# Patient Record
Sex: MALE | Race: WHITE | NOT HISPANIC OR LATINO | Employment: OTHER | URBAN - NONMETROPOLITAN AREA
[De-identification: names, ages, dates, MRNs, and addresses within clinical notes are randomized per-mention and may not be internally consistent; named-entity substitution may affect disease eponyms.]

---

## 2019-10-14 ENCOUNTER — OFFICE VISIT (OUTPATIENT)
Dept: INTERNAL MEDICINE | Facility: CLINIC | Age: 59
End: 2019-10-14

## 2019-10-14 VITALS
RESPIRATION RATE: 16 BRPM | BODY MASS INDEX: 29.12 KG/M2 | HEIGHT: 72 IN | SYSTOLIC BLOOD PRESSURE: 134 MMHG | DIASTOLIC BLOOD PRESSURE: 80 MMHG | HEART RATE: 96 BPM | OXYGEN SATURATION: 99 % | WEIGHT: 215 LBS | TEMPERATURE: 98.4 F

## 2019-10-14 DIAGNOSIS — J01.10 ACUTE NON-RECURRENT FRONTAL SINUSITIS: Primary | ICD-10-CM

## 2019-10-14 PROCEDURE — 99203 OFFICE O/P NEW LOW 30 MIN: CPT | Performed by: INTERNAL MEDICINE

## 2019-10-14 RX ORDER — ATORVASTATIN CALCIUM 20 MG/1
20 TABLET, FILM COATED ORAL DAILY
Refills: 1 | COMMUNITY
Start: 2019-09-16 | End: 2020-07-06 | Stop reason: SDUPTHER

## 2019-10-14 RX ORDER — PSEUDOEPHEDRINE HCL 30 MG
30 TABLET ORAL EVERY 6 HOURS PRN
Qty: 30 TABLET | Refills: 0 | Status: SHIPPED | OUTPATIENT
Start: 2019-10-14 | End: 2020-01-30

## 2019-10-14 RX ORDER — LEVOFLOXACIN 750 MG/1
750 TABLET ORAL DAILY
Qty: 5 TABLET | Refills: 0 | Status: SHIPPED | OUTPATIENT
Start: 2019-10-14 | End: 2019-10-31

## 2019-10-14 RX ORDER — GUAIFENESIN 600 MG/1
1200 TABLET, EXTENDED RELEASE ORAL 2 TIMES DAILY
Qty: 20 TABLET | Refills: 0 | Status: SHIPPED | OUTPATIENT
Start: 2019-10-14 | End: 2020-01-30

## 2019-10-14 RX ORDER — MELOXICAM 15 MG/1
15 TABLET ORAL DAILY
Refills: 3 | COMMUNITY
Start: 2019-09-16 | End: 2021-04-06 | Stop reason: SDUPTHER

## 2019-10-14 RX ORDER — LISINOPRIL 10 MG/1
10 TABLET ORAL DAILY
Refills: 3 | COMMUNITY
Start: 2019-09-16 | End: 2020-07-13

## 2019-10-14 RX ORDER — OMEPRAZOLE 20 MG/1
20 CAPSULE, DELAYED RELEASE ORAL DAILY
Refills: 3 | COMMUNITY
Start: 2019-09-16 | End: 2020-07-13 | Stop reason: SDUPTHER

## 2019-10-14 NOTE — PROGRESS NOTES
Subjective     Chief Complaint   Patient presents with   • Sinus Problem     headcold       History of Present Illness  About 3 weeks ago patient started having lef pilar tooth pain. Has had a root canal and crown. Patient took some ibuprofen for a couple days weds better. Thursday morning all of the teeth on the left side were hurting. Was concerned about an abscess. When bending over brushing teeth left yellow nasal drainage. Saw dentist on 2 weeks ago on Friday. Zpack and pain medication was given. Slightly improved. Continued to have yellow nasal discharge. Patient was given doxycyline for 10 days. Improved. Patient was also given a prednisone pack and Flonaise.   When drove to [place] about a week ago, started having frontal bilateral sinus pain. Worse on the left and traces eyebrow. Feels about 75% good.   No fever, no chills. Occasional nausea. Appetite good. No ear pain. Little cough. Mild decrease in hearing in the let ear, but after Doxycycline, patient has improved.   Kidney function is good. No HX of renal failure. No HX of seizure disorder. No HX of torn rotator cuffs or tendon injury.     Patient's PMR from outside medical facility reviewed and noted.    Review of Systems   Constitutional: Negative for chills and fever.   HENT: Positive for sinus pressure and sinus pain. Negative for sore throat.    Respiratory: Positive for cough. Negative for shortness of breath.    Gastrointestinal: Positive for nausea. Negative for diarrhea and vomiting.   Skin: Negative for color change and wound.        Otherwise complete ROS reviewed and negative except as mentioned in the HPI.    Past Medical History:   Past Medical History:   Diagnosis Date   • Arthritis    • GERD (gastroesophageal reflux disease)    • Hyperlipidemia    • Hypertension      Past Surgical History:History reviewed. No pertinent surgical history.     Social History:  reports that he quit smoking about 21 years ago. His smoking use  "included cigarettes. He has never used smokeless tobacco. He reports that he drinks alcohol. He reports that he does not use drugs.    Family History: family history includes Cancer in his mother; Tuberculosis in his maternal grandfather.       Allergies:  Allergies   Allergen Reactions   • Sulfa Antibiotics Shortness Of Breath   • Penicillins Rash     Happened 30 yrs + ago       Medications:  Prior to Admission medications    Medication Sig Start Date End Date Taking? Authorizing Provider   atorvastatin (LIPITOR) 20 MG tablet Take 20 mg by mouth Daily. 9/16/19   Hao Otoole MD   lisinopril (PRINIVIL,ZESTRIL) 10 MG tablet Take 10 mg by mouth Daily. 9/16/19   Hao Otoole MD   meloxicam (MOBIC) 15 MG tablet Take 15 mg by mouth Daily. Takes every other day for back 9/16/19   Hao Otoole MD   metFORMIN (GLUCOPHAGE) 500 MG tablet Take 500 mg by mouth 2 (Two) Times a Day. 8/20/19   Hao Otoole MD   omeprazole (priLOSEC) 20 MG capsule Take 20 mg by mouth Daily. 9/16/19   Hao Otoole MD       Objective     Vital Signs: /80 (BP Location: Left arm, Patient Position: Sitting, Cuff Size: Adult)   Pulse 96   Temp 98.4 °F (36.9 °C) (Oral)   Resp 16   Ht 182.9 cm (72\")   Wt 97.5 kg (215 lb)   SpO2 99%   BMI 29.16 kg/m²   Physical Exam   Constitutional: He appears well-developed and well-nourished.   HENT:   Head: Normocephalic and atraumatic.   Nose: Nose normal.   Mouth/Throat: Oropharynx is clear and moist.   Eyes: Conjunctivae and EOM are normal.   Neck: Normal range of motion. Neck supple.   Cardiovascular: Normal rate, regular rhythm and normal heart sounds.   Pulmonary/Chest: Effort normal and breath sounds normal.   Abdominal: Soft. Bowel sounds are normal.   Musculoskeletal: He exhibits no edema or tenderness.   Neurological: He is alert. No cranial nerve deficit.   Skin: Skin is warm and dry.   Psychiatric: He has a normal mood and affect.   Vitals " reviewed.      Patient's Body mass index is 29.16 kg/m². BMI is within normal parameters. No follow-up required..      Results Reviewed:  No results found for: GLUCOSE, BUN, CREATININE, NA, K, CL, CO2, CALCIUM, ALT, AST, WBC, HCT, PLT, CHOL, TRIG, HDL, LDL, LDLHDL, HGBA1C      Assessment / Plan     Assessment/Plan:  1. Acute non-recurrent frontal sinusitis  - pseudoephedrine (SUDAFED) 30 MG tablet; Take 1 tablet by mouth Every 6 (Six) Hours As Needed for Congestion.  Dispense: 30 tablet; Refill: 0  - guaiFENesin (MUCINEX) 600 MG 12 hr tablet; Take 2 tablets by mouth 2 (Two) Times a Day.  Dispense: 20 tablet; Refill: 0  - levoFLOXacin (LEVAQUIN) 750 MG tablet; Take 1 tablet by mouth Daily.  Dispense: 5 tablet; Refill: 0    Discussed sie effects from Sudafed. Patient voiced understanding. Discussed drinking fluids while on Levaquin.     Return if symptoms worsen or fail to improve. unless patient needs to be seen sooner or acute issues arise.    Code Status: full    I have discussed the patient results/orders and and plan/recommendation with them at today's visit.      Elizabeth French, DO   10/14/2019

## 2019-10-31 ENCOUNTER — OFFICE VISIT (OUTPATIENT)
Dept: INTERNAL MEDICINE | Facility: CLINIC | Age: 59
End: 2019-10-31

## 2019-10-31 ENCOUNTER — RESULTS ENCOUNTER (OUTPATIENT)
Dept: INTERNAL MEDICINE | Facility: CLINIC | Age: 59
End: 2019-10-31

## 2019-10-31 VITALS
HEART RATE: 87 BPM | BODY MASS INDEX: 29.66 KG/M2 | SYSTOLIC BLOOD PRESSURE: 136 MMHG | HEIGHT: 72 IN | TEMPERATURE: 98.1 F | OXYGEN SATURATION: 98 % | WEIGHT: 219 LBS | DIASTOLIC BLOOD PRESSURE: 88 MMHG

## 2019-10-31 DIAGNOSIS — J01.10 ACUTE NON-RECURRENT FRONTAL SINUSITIS: Primary | ICD-10-CM

## 2019-10-31 DIAGNOSIS — H60.311 ACUTE DIFFUSE OTITIS EXTERNA OF RIGHT EAR: ICD-10-CM

## 2019-10-31 DIAGNOSIS — J01.10 ACUTE NON-RECURRENT FRONTAL SINUSITIS: ICD-10-CM

## 2019-10-31 PROCEDURE — 99213 OFFICE O/P EST LOW 20 MIN: CPT | Performed by: INTERNAL MEDICINE

## 2019-10-31 RX ORDER — AZELASTINE 1 MG/ML
2 SPRAY, METERED NASAL 2 TIMES DAILY
Qty: 30 ML | Refills: 12 | Status: SHIPPED | OUTPATIENT
Start: 2019-10-31 | End: 2019-12-19 | Stop reason: SDUPTHER

## 2019-10-31 RX ORDER — CIPROFLOXACIN AND DEXAMETHASONE 3; 1 MG/ML; MG/ML
4 SUSPENSION/ DROPS AURICULAR (OTIC) 4 TIMES DAILY
Qty: 7.5 ML | Refills: 0 | Status: SHIPPED | OUTPATIENT
Start: 2019-10-31 | End: 2020-01-30

## 2019-10-31 RX ORDER — LEVOFLOXACIN 750 MG/1
750 TABLET ORAL DAILY
Qty: 5 TABLET | Refills: 0 | Status: SHIPPED | OUTPATIENT
Start: 2019-10-31 | End: 2020-01-30

## 2019-10-31 NOTE — PROGRESS NOTES
Subjective     Chief Complaint   Patient presents with   • Nasal Congestion       History of Present Illness  Yellow nasal discharge from the left. Frontal sinus pressure. Recovered from the previous infection, but now feels like it is coming back. States that he had some rapid heart beats from the Sudafed and DCd it as directed. Doesn't feel like his breathing has been affected. Mild cough from drainage. No fever or chills.     Patient's PMR from outside medical facility reviewed and noted.    Review of Systems   Constitutional: Negative for chills and fatigue.   HENT: Positive for ear discharge, ear pain, sinus pressure and sinus pain. Negative for tinnitus.    Respiratory: Positive for cough. Negative for shortness of breath.    Gastrointestinal: Negative for constipation, diarrhea, nausea and vomiting.   Genitourinary: Negative for dysuria and hematuria.   Skin: Negative for color change and wound.   Neurological: Negative for seizures and headaches.        Otherwise complete ROS reviewed and negative except as mentioned in the HPI.    Past Medical History:   Past Medical History:   Diagnosis Date   • Arthritis    • GERD (gastroesophageal reflux disease)    • Hyperlipidemia    • Hypertension      Past Surgical History:History reviewed. No pertinent surgical history.  Social History:  reports that he quit smoking about 21 years ago. His smoking use included cigarettes. He has never used smokeless tobacco. He reports that he drinks alcohol. He reports that he does not use drugs.    Family History: family history includes Cancer in his mother; Tuberculosis in his maternal grandfather.       Allergies:  Allergies   Allergen Reactions   • Sulfa Antibiotics Shortness Of Breath   • Penicillins Rash     Happened 30 yrs + ago       Medications:  Prior to Admission medications    Medication Sig Start Date End Date Taking? Authorizing Provider   atorvastatin (LIPITOR) 20 MG tablet Take 20 mg by mouth Daily. 9/16/19  " Yes ProviderHao MD   guaiFENesin (MUCINEX) 600 MG 12 hr tablet Take 2 tablets by mouth 2 (Two) Times a Day. 10/14/19  Yes Elizabeth French DO   lisinopril (PRINIVIL,ZESTRIL) 10 MG tablet Take 10 mg by mouth Daily. 9/16/19  Yes Hao Otoole MD   meloxicam (MOBIC) 15 MG tablet Take 15 mg by mouth Daily. Takes every other day for back 9/16/19  Yes Hao Otoole MD   metFORMIN (GLUCOPHAGE) 500 MG tablet Take 500 mg by mouth 2 (Two) Times a Day. 8/20/19  Yes Hao Otoole MD   omeprazole (priLOSEC) 20 MG capsule Take 20 mg by mouth Daily. 9/16/19  Yes Hao Otoole MD   levoFLOXacin (LEVAQUIN) 750 MG tablet Take 1 tablet by mouth Daily. 10/14/19   Elizabeth French DO   pseudoephedrine (SUDAFED) 30 MG tablet Take 1 tablet by mouth Every 6 (Six) Hours As Needed for Congestion. 10/14/19   Elizabeth French DO       Objective     Vital Signs: /88 (BP Location: Left arm, Patient Position: Sitting, Cuff Size: Large Adult)   Pulse 87   Temp 98.1 °F (36.7 °C) (Oral)   Ht 182.9 cm (72\")   Wt 99.3 kg (219 lb)   SpO2 98%   BMI 29.70 kg/m²   Physical Exam   Constitutional: He appears well-developed and well-nourished.   HENT:   Head: Normocephalic and atraumatic.   Nose: Nose normal.   Mouth/Throat: Oropharynx is clear and moist.   Right ear canal is bright red. Yellow discharge at the TM with erythema of the TM.    Eyes: Conjunctivae and EOM are normal.   Neck: Normal range of motion. Neck supple.   Cardiovascular: Normal rate, regular rhythm and normal heart sounds.   Pulmonary/Chest: Effort normal and breath sounds normal.   Abdominal: Soft. Bowel sounds are normal.   Musculoskeletal: He exhibits no edema or tenderness.   Neurological: He is alert. No cranial nerve deficit.   Skin: Skin is warm and dry.   Psychiatric: He has a normal mood and affect.   Vitals reviewed.      Patient's Body mass index is 29.7 kg/m². BMI is within normal parameters. No follow-up " required..      Results Reviewed:  No results found for: GLUCOSE, BUN, CREATININE, NA, K, CL, CO2, CALCIUM, ALT, AST, WBC, HCT, PLT, CHOL, TRIG, HDL, LDL, LDLHDL, HGBA1C      Assessment / Plan     Assessment/Plan:  1. Acute non-recurrent frontal sinusitis  - Respiratory Panel, PCR - Swab, Nasopharynx; Future  - Respiratory Culture - Sputum, Cough; Future  - azelastine (ASTELIN) 0.1 % nasal spray; 2 sprays into the nostril(s) as directed by provider 2 (Two) Times a Day. Use in each nostril as directed  Dispense: 30 mL; Refill: 12  - levoFLOXacin (LEVAQUIN) 750 MG tablet; Take 1 tablet by mouth Daily.  Dispense: 5 tablet; Refill: 0    2. Acute diffuse otitis externa of right ear  - ciprofloxacin-dexamethasone (CIPRODEX) 0.3-0.1 % otic suspension; Administer 4 drops to the right ear 4 (Four) Times a Day.  Dispense: 7.5 mL; Refill: 0    - Nothing in the ears but the medication. Dry the ear out and return for FU to evaluate for fungus and resolution. If worsens, call.     Return in about 2 weeks (around 11/14/2019) for Recheck. unless patient needs to be seen sooner or acute issues arise.    Code Status: Full    I have discussed the patient results/orders and and plan/recommendation with them at today's visit.      Elizabeth French, DO   10/31/2019

## 2019-12-19 ENCOUNTER — OFFICE VISIT (OUTPATIENT)
Dept: INTERNAL MEDICINE | Facility: CLINIC | Age: 59
End: 2019-12-19

## 2019-12-19 VITALS
TEMPERATURE: 98 F | BODY MASS INDEX: 30.48 KG/M2 | HEART RATE: 86 BPM | OXYGEN SATURATION: 99 % | WEIGHT: 225 LBS | SYSTOLIC BLOOD PRESSURE: 162 MMHG | HEIGHT: 72 IN | DIASTOLIC BLOOD PRESSURE: 85 MMHG

## 2019-12-19 DIAGNOSIS — J01.10 ACUTE NON-RECURRENT FRONTAL SINUSITIS: ICD-10-CM

## 2019-12-19 DIAGNOSIS — G89.29 CHRONIC MIDLINE LOW BACK PAIN WITH RIGHT-SIDED SCIATICA: ICD-10-CM

## 2019-12-19 DIAGNOSIS — R09.81 NASAL CONGESTION: ICD-10-CM

## 2019-12-19 DIAGNOSIS — R06.83 SNORING: Primary | ICD-10-CM

## 2019-12-19 DIAGNOSIS — H61.21 IMPACTED CERUMEN OF RIGHT EAR: ICD-10-CM

## 2019-12-19 DIAGNOSIS — M54.41 CHRONIC MIDLINE LOW BACK PAIN WITH RIGHT-SIDED SCIATICA: ICD-10-CM

## 2019-12-19 PROCEDURE — 99213 OFFICE O/P EST LOW 20 MIN: CPT | Performed by: INTERNAL MEDICINE

## 2019-12-19 RX ORDER — AZELASTINE 1 MG/ML
2 SPRAY, METERED NASAL 2 TIMES DAILY
Qty: 30 ML | Refills: 12 | Status: SHIPPED | OUTPATIENT
Start: 2019-12-19 | End: 2020-01-30

## 2019-12-19 NOTE — PROGRESS NOTES
Subjective     Chief Complaint   Patient presents with   • Earache     stopped up feeling   • Sinus Problem       History of Present Illness  Drainage in the back of his throat. Hearing feels like it is at 60%. Worse on the right.  Has been using Flonase.  Feels occasionally like heart is racing. Over the last week has happened about 2-3 times. Patient has been noticing at night and wakes him up. We discuss LINH. Patient states that he knows that he has it. Hasn't had a good night sleep since he stopped smoking.  Patient has seen Manish Ayoub in the past from back pain. Sciatic nerve issues. Was DDX with DJD. Over the last 6-7 months, the patient has had worsening back pain. He takes Mobic QOD.   Sees Dr. Garvey on Friday and is going to talk to him about it.    Patient's PMR from outside medical facility reviewed and noted.    Review of Systems   Constitutional: Negative for chills and fever.   HENT: Positive for congestion, ear pain, hearing loss and sinus pressure.    Respiratory: Negative for cough and shortness of breath.    Cardiovascular: Positive for palpitations. Negative for chest pain and leg swelling.   Gastrointestinal: Negative for constipation, diarrhea, nausea and vomiting.   Genitourinary: Negative for dysuria and hematuria.   Musculoskeletal: Positive for back pain. Negative for joint swelling.   Skin: Negative for color change and wound.   Neurological: Negative for weakness and headaches.        Otherwise complete ROS reviewed and negative except as mentioned in the HPI.    Past Medical History:   Past Medical History:   Diagnosis Date   • Arthritis    • GERD (gastroesophageal reflux disease)    • Hyperlipidemia    • Hypertension      Past Surgical History:History reviewed. No pertinent surgical history.     Social History:  reports that he quit smoking about 21 years ago. His smoking use included cigarettes. He has never used smokeless tobacco. He reports that he drinks alcohol. He reports  "that he does not use drugs.    Family History: family history includes Cancer in his mother; Tuberculosis in his maternal grandfather.       Allergies:  Allergies   Allergen Reactions   • Sulfa Antibiotics Shortness Of Breath   • Penicillins Rash     Happened 30 yrs + ago       Medications:  Prior to Admission medications    Medication Sig Start Date End Date Taking? Authorizing Provider   atorvastatin (LIPITOR) 20 MG tablet Take 20 mg by mouth Daily. 9/16/19  Yes Hao Otoole MD   azelastine (ASTELIN) 0.1 % nasal spray 2 sprays into the nostril(s) as directed by provider 2 (Two) Times a Day. Use in each nostril as directed 10/31/19  Yes Elizabeth French DO   lisinopril (PRINIVIL,ZESTRIL) 10 MG tablet Take 10 mg by mouth Daily. 9/16/19  Yes Hao Otoole MD   meloxicam (MOBIC) 15 MG tablet Take 15 mg by mouth Daily. Takes every other day for back 9/16/19  Yes ProviderHao MD   metFORMIN (GLUCOPHAGE) 500 MG tablet Take 500 mg by mouth 2 (Two) Times a Day. 8/20/19  Yes ProviderHao MD   omeprazole (priLOSEC) 20 MG capsule Take 20 mg by mouth Daily. 9/16/19  Yes ProviderHao MD   ciprofloxacin-dexamethasone (CIPRODEX) 0.3-0.1 % otic suspension Administer 4 drops to the right ear 4 (Four) Times a Day. 10/31/19   Elizabeth French DO   guaiFENesin (MUCINEX) 600 MG 12 hr tablet Take 2 tablets by mouth 2 (Two) Times a Day. 10/14/19   Elizabeth French DO   levoFLOXacin (LEVAQUIN) 750 MG tablet Take 1 tablet by mouth Daily. 10/31/19   Elizabeth French DO   pseudoephedrine (SUDAFED) 30 MG tablet Take 1 tablet by mouth Every 6 (Six) Hours As Needed for Congestion. 10/14/19   Elizabeth French DO       Objective     Vital Signs: /85 (BP Location: Left arm, Patient Position: Sitting, Cuff Size: Adult)   Pulse 86   Temp 98 °F (36.7 °C) (Temporal)   Ht 182.9 cm (72\")   Wt 102 kg (225 lb)   SpO2 99%   BMI 30.52 kg/m²   Physical Exam   Constitutional: He appears " well-developed and well-nourished.   HENT:   Head: Normocephalic and atraumatic.   Nose: Nose normal.   Mouth/Throat: Oropharynx is clear and moist.   Right cerumen impaction. Nasal turbinate boggy and edematous.    Eyes: Conjunctivae and EOM are normal.   Neck: Normal range of motion. Neck supple.   Cardiovascular: Normal rate, regular rhythm and normal heart sounds.   Pulmonary/Chest: Effort normal and breath sounds normal.   Abdominal: Soft. Bowel sounds are normal.   Musculoskeletal: He exhibits no edema or tenderness.   Neurological: He is alert. No cranial nerve deficit.   Skin: Skin is warm and dry.   Psychiatric: He has a normal mood and affect.   Vitals reviewed.      Patient's Body mass index is 30.52 kg/m². BMI is within normal parameters. No follow-up required..      Results Reviewed:  No results found for: GLUCOSE, BUN, CREATININE, NA, K, CL, CO2, CALCIUM, ALT, AST, WBC, HCT, PLT, CHOL, TRIG, HDL, LDL, LDLHDL, HGBA1C      Assessment / Plan     Assessment/Plan:  1. Snoring  - Home Sleep Study; Future  - We discuss that his palpitation wkaing him may be arrhythmia from sleep apnea.     2. Chronic midline low back pain with right-sided sciatica  - Ambulatory Referral to Physical Therapy    3. Nasal congestion  - Ambulatory Referral to ENT (Otolaryngology)  - Resume Astelin. RX renewal sent to pharmacy.     4. Acute non-recurrent frontal sinusitis  - azelastine (ASTELIN) 0.1 % nasal spray; 2 sprays into the nostril(s) as directed by provider 2 (Two) Times a Day. Use in each nostril as directed  Dispense: 30 mL; Refill: 12    5. Right cerumen impaction  -OTC debrox  - Return in 2 weeks to have wax removed once softened.     Return if symptoms worsen or fail to improve. unless patient needs to be seen sooner or acute issues arise.    Code Status: Full    I have discussed the patient results/orders and and plan/recommendation with them at today's visit.      Elizabeth French, DO   12/19/2019

## 2019-12-23 ENCOUNTER — OFFICE VISIT (OUTPATIENT)
Dept: OTOLARYNGOLOGY | Facility: CLINIC | Age: 59
End: 2019-12-23

## 2019-12-23 VITALS
SYSTOLIC BLOOD PRESSURE: 116 MMHG | BODY MASS INDEX: 30.8 KG/M2 | HEART RATE: 109 BPM | HEIGHT: 72 IN | WEIGHT: 227.4 LBS | DIASTOLIC BLOOD PRESSURE: 73 MMHG | TEMPERATURE: 96.8 F

## 2019-12-23 DIAGNOSIS — H61.23 CERUMEN DEBRIS ON TYMPANIC MEMBRANE, BILATERAL: Primary | ICD-10-CM

## 2019-12-23 DIAGNOSIS — H69.83 DYSFUNCTION OF BOTH EUSTACHIAN TUBES: ICD-10-CM

## 2019-12-23 DIAGNOSIS — H90.0 HEARING LOSS, CONDUCTIVE, BILATERAL: ICD-10-CM

## 2019-12-23 DIAGNOSIS — J31.0 RHINITIS, CHRONIC: ICD-10-CM

## 2019-12-23 DIAGNOSIS — J34.2 ACQUIRED DEVIATED NASAL SEPTUM: ICD-10-CM

## 2019-12-23 DIAGNOSIS — J32.9 CHRONIC SINUSITIS, UNSPECIFIED LOCATION: ICD-10-CM

## 2019-12-23 PROCEDURE — 99204 OFFICE O/P NEW MOD 45 MIN: CPT | Performed by: OTOLARYNGOLOGY

## 2019-12-23 PROCEDURE — 69210 REMOVE IMPACTED EAR WAX UNI: CPT | Performed by: OTOLARYNGOLOGY

## 2019-12-23 PROCEDURE — 31231 NASAL ENDOSCOPY DX: CPT | Performed by: OTOLARYNGOLOGY

## 2019-12-23 RX ORDER — FLUTICASONE PROPIONATE 50 MCG
2 SPRAY, SUSPENSION (ML) NASAL 2 TIMES DAILY
Qty: 48 G | Refills: 3 | Status: SHIPPED | OUTPATIENT
Start: 2019-12-23 | End: 2020-07-13 | Stop reason: SDUPTHER

## 2019-12-23 RX ORDER — PREDNISONE 10 MG/1
TABLET ORAL
Qty: 21 TABLET | Refills: 1 | Status: SHIPPED | OUTPATIENT
Start: 2019-12-23 | End: 2020-01-01

## 2019-12-23 RX ORDER — OXYMETAZOLINE HYDROCHLORIDE 0.05 G/100ML
2 SPRAY NASAL 3 TIMES DAILY
Qty: 2 ML | Refills: 0 | Status: SHIPPED | OUTPATIENT
Start: 2019-12-23 | End: 2019-12-26

## 2019-12-23 NOTE — PATIENT INSTRUCTIONS
EAR CARE: :using oil  Use a hair dryer on low heat and blow into the ear canals 2 times daily to keep ears dry.  DO Not use Q tips or Dinora pins, ever!!  Do not use alcohol in the ear canal (this will cause dryness and itching)  NO peroxide or alcohol in ears  Oil: Use Sweet oil, Olive oil, or Mineral oil, purchased over the counter, once a week, Do not use Q tips, You may use a hair dryer on low heat. Blow in ears for 10-15 seconds 2 times daily to dry ear canals or if ear canals are itching.    NASAL SALINE:  Use 2 puffs each nostril 4-6 times daily and more frequently if possible.  You can buy saline spray or you can make your own and use an old spray bottle to administer  Use a humidifier at bedside  Recipe for saline:d  Water                                 1 quart  Salt (table)                        1 tablespoon  Gylcerin (or Poly Syrup)    1 teaspoon  Sodium bicarbonate           1 teaspoon  Sprays or Divide pots are recommended    Nasal steroid use:  Using nasal steroids:  You will be prescribed one of the following nasal steroids: Flonase, Nasacort, Nasonex, Rhinocort, Qnasl, Zetonna  2 puffs each nostril 2 times daily  Start as soon as possible  If you are using Afrin for 3 days with the nasal steroid,  Use Afrin first and wait 10 minutes to allow the nose to open. Then administer nasal steroids.    Afrin use:  Use 2 puffs each nostril 3 times daily for 3 days only!!  Stop using after 3 days unless instructed to use longer    Stop Azelatine spray    Prednisone 10 mg wean take as directed.    Thank you for enrolling in [facility]. Please follow the instructions below to securely access your online medical record. [facility] allows you to send messages to your doctor, view your test results, renew your prescriptions, schedule appointments, and more.    How Do I Sign Up?  1. In your Internet browser, go to [facility]  2. Click on the Sign Up Now link in the New User? box.   3. Enter your [employer]  Activation Code exactly as it appears below. You will not need to use this code after you have completed the sign-up process. If you do not sign up before the expiration date, you must request a new code.  [facility] Activation Code: [email or web address]  Expires: 1/14/2020  3:41 AM    4. Enter the last four digits of your Social Security Number and your Date of Birth as indicated and click Next. You will be taken to the next sign-up page.  5. Create a [facility] username. Think of one that is secure and easy to remember.  6. Create a [employer] password. You can change your password at any time.  7. Choose a security question, enter your answer, and click Next. This can be used to access [facility] if you forget your password.   8. Select your communication preference. Enter a valid e-mail address if you would like to receive e-mail notifications when new information is available in [employer].  9. Click Sign In. You can now view your medical record.     Additional Information  If you have questions, you can e-mail [email or web address], or call [phone] to talk to our [employer] staff. Remember, [employer] is NOT to be used for urgent needs. For medical emergencies, dial 911.

## 2019-12-23 NOTE — PROGRESS NOTES
ENT PROCEDURE NOTE     Anesthesia: topical 4% tetracaine and oxymetazoline mix    Endoscopy Type:  Nasal Endoscopic Examination    Procedure Details:    The patient was placed in the sitting position. After topical anesthesia and decongestion,  a rigid nasal endoscope  0 degree was passed along the nasal floor to the nasopharynx.  The scope was then passed to the middle and superior aspects of the nasal cavity. Systematic examination of the nasal cavity, nasopharynx and structures was performed.     Findings:  NASAL ENDOSCOPIC FINDINGS: Nasal endoscopy   NASALMUCOSA:    abnormal:        Bilateral- red, dry thick    NASAL PASSAGES:     abnormal   Left- partially obstructed by septum, Right- very narrowed   NASAL VALVE:     intact, good support and no nasal obstruction   SEPTUM:     mucosa abnormal   Bilateral- red, dry, thick     deviation present:      deviated Left low/mid shelf almost touching IT   INFERIOR TURBINATES:     abnormal  Bilateral- red, thick, enlarged    MIDDLE TURBINATES:     abnormal:        BILATERAL: red, moist, enlarged   MIDDLE MEATUS:       findings       BILATERAL: mucosa thick, secretions present, no mucopus   SPHENO-ETHMOID RECESS:    normal, normal sphenoid ostia. no mucopus, non-surgical   NASOPHARYNX:     Adenoids:  red, thick     Eustachian tubes:        BILATERAL: red, thick   SECRETIONS:     abnormal Bilateral- dry, no mucopus     Condition:  Stable.  Patient tolerated procedure well.    Complications:  None    Post-procedure instructions reviewed with Patient  Instructions documented in AVS for patient to review    Cricket Olguin Jr, MD  12/23/19  3:15 PM

## 2019-12-23 NOTE — PROGRESS NOTES
Cricket Olguin Jr, MD     Chief Complaint   Patient presents with   • Sinus Problem        HISTORY OF PRESENT ILLNESS:   Accompanied by:   No one  Brandon Campbell is a  59 y.o. male with sinus infection.  He awoke in Fall, with yellow thin liquid from L nostril. He blew nose, Later, LUQ tooth hurt. Had another episode later in the day.  Thought abscessed tooth. Dentist evaluated. Told sinus.  Saw Dr Ayers. Face pain. Given Z pack and pain pills. Had severe pain. Z gerber no help. PCP changed antibiotics. Partially helped. Pain has resolved.  Seen 2 weeks after and repeated the antibiotics and steroids.  He now has no pain, but has clogged ears, PND.  No oral steroids.  He has been given nose spray and ear drops. Day 3 nose spray. Changed from Flonase to Azelastine.  Imaging- none  Smoke- none  Drink- 2 daily.  Allergy testing none  Hearing - decreased solomon lately    Review of Systems  Reviewed per patient intake note and confirmed with patient    Past History:  Past Medical History:   Diagnosis Date   • Arthritis    • GERD (gastroesophageal reflux disease)    • Hyperlipidemia    • Hypertension      History reviewed. No pertinent surgical history.  Family History   Problem Relation Age of Onset   • Cancer Mother    • Tuberculosis Maternal Grandfather      Social History     Tobacco Use   • Smoking status: Former Smoker     Types: Cigarettes     Last attempt to quit: 1998     Years since quittin.9   • Smokeless tobacco: Never Used   Substance Use Topics   • Alcohol use: Yes   • Drug use: No     No outpatient medications have been marked as taking for the 19 encounter (Office Visit) with Circket Olguin Jr., MD.     Allergies:  Sulfa antibiotics and Penicillins        Vital Signs:   Temp:  [96.8 °F (36 °C)] 96.8 °F (36 °C)  Heart Rate:  [109] 109  BP: (116)/(73) 116/73    EXAMINATION:   CONSTITUTIONAL:    well nourished, well-developed, alert, oriented, in no acute distress     BODY HABITUS:    Normal  body habitus    COMMUNICATION:    able to communicate normally, normal voice quality    HEAD:     Normocephalic, without obvious abnormality, atraumatic    FACE:    structure normal, no tenderness present, no lesions/masses, no evidence of trauma    SALIVARY GLANDS:    parotid glands with no tenderness, no swelling, no masses, submandibular glands with normal size, nontender     EYE:    ocular motility normal, eyelids normal, orbits normal, no proptosis, conjunctiva clear, sclera non-icteric, pupils equal, round, reactive to light and accomodation    HEARING:    response to conversational voice normal    EARS:    Otoscopic exam   Microscopic exam- See procedure note     NOSE EXTERNAL:    APPEARANCE: normal, straight, with good projection, no tenderness, no lesions, no tenderness, good nasal support, patent nares    NOSE INTERNAL: Nasal endoscopy        See procedure note for details Bilateral    ORAL CAVITY:    Normal lips with no lesions, dentition normal for age, FOM intact without lesions and normal salivary flow, Mucosa intact without lesions, Hard and soft palate normal without lesions    OROPHARYNX:    Direct examination  oropharyngeal mucosa normal, tonsil(s) with normal appearance      NECK:    short, thick  moderate   Neck position: lordotic    LYMPH NODES :    no adenopathy    THYROID:    no overt thyromegaly, no tenderness, nodules or mass present on palpation, position midline    CHEST/RESPIRATORY:    respiratory effort normal, no rales, rubs or wheezing, no stridor, normal appearance to chest    CARDIOVASCULAR:    regular rate and rhythm, no murmurs, gallups, no peripheral edema    NEURO/PSYCHIATRIC :    oriented appropriately for age, mood normal, affect appropriate, cranial nerves intact grossly (unless specifically described), gait normal for age    RESULTS REVIEW:    I have reviewed the patients old records in the chart.        ASSESSMENT:      Diagnosis Plan   1. Cerumen debris on tympanic  membrane, bilateral      Blocking TM and hearing  Hearing improved AU   2. Rhinitis, chronic  CT Sinus Without Contrast    mod inflammation   3. Dysfunction of both eustachian tubes      from Rhinitis   4. Hearing loss, conductive, bilateral      from cerumen impaction   5. Acquired deviated nasal septum  CT Sinus Without Contrast    R to L low/mid mild to mod   6. Chronic sinusitis, unspecified location  CT Sinus Without Contrast    by history           PLAN:     Conservative management.  patient has Bilateral mod cerumen causing hearing loss and ear symptoms. I have cleared today.  Nose appears inflamed and without infection today. I will treat with topical and oral steroids. I will get CT since he has been treated with antibiotics multiple times and no imaging obtained.  Afrin x 3 days  Flonase BID  Nasal saline  Stop Azelastine  Pred wean 10 mg  MY CHART:  Patient is Encouraged to enroll in My Chart  Encouraged to review data and findings in My Chart  New Medications Ordered This Visit   Medications   • fluticasone (FLONASE) 50 MCG/ACT nasal spray     Si sprays into the nostril(s) as directed by provider 2 (Two) Times a Day.     Dispense:  48 g     Refill:  3   • oxymetazoline (AFRIN) 0.05 % nasal spray     Si sprays into the nostril(s) as directed by provider 3 (Three) Times a Day for 3 days. Use for 3 days then stop     Dispense:  2 mL     Refill:  0   • predniSONE (DELTASONE) 10 MG tablet     Sig: Take 1 tablet by mouth 3 (Three) Times a Day for 3 days, THEN 1 tablet 2 (Two) Times a Day for 3 days, THEN 1 tablet Daily for 3 days.     Dispense:  21 tablet     Refill:  1     Planned taper     Orders Placed This Encounter   Procedures   • CT Sinus Without Contrast          Patient understand(s) and agree(s) with the treatment plan as described.  Return After CT sinus, for Recheck nose and ears.       Cricket Olguin Jr, MD  19  3:18 PM

## 2019-12-23 NOTE — PROGRESS NOTES
Nelida Ricks LPN   Patient Intake Note    Review of Systems  Review of Systems   Constitutional: Negative for chills, fatigue and fever.   HENT:        See hPI   Respiratory: Positive for cough. Negative for choking and shortness of breath.    Gastrointestinal: Negative for diarrhea, nausea and vomiting.   Neurological: Positive for dizziness, light-headedness and headaches.   Psychiatric/Behavioral: Negative for sleep disturbance.       Tobacco Use: Screening and Cessation Intervention  Social History    Tobacco Use      Smoking status: Former Smoker        Types: Cigarettes        Quit date: 1998        Years since quittin.9      Smokeless tobacco: Never Used        Nelida Ricks LPN  2019  3:03 PM

## 2019-12-23 NOTE — PROGRESS NOTES
ENT PROCEDURE NOTE     Pre-operative Diagnosis: Cerumen impaction    Post-operative Diagnosis: same    Anesthesia: none    Procedure:  Binocular ear microscopy with cerumen removal    Side:  BILATERAL Ears    Procedure Details:    The patient was placed supine on the procedure table. Using a speculum, the EAR SIDE: both ears were  examined with the microscope.   Using Micro-instrumentation and suction, the cerumen was cleared. Oil and suction was used    Findings:   Ear Canal: Bilateral obstructed with cerumen over tympanic membrane  AU- tympanic membrane thickened, moist, mildly macerated  Tympanic Membrane: Thickened, moist, mildly red, no infection  Ossicular Chain: intact AU  Middle ear space: not well seen AU  Hearing: improved after cleaning    Condition:  Stable.  Patient tolerated procedure well.    Complications:  None    Post-procedure instructions reviewed with Patient  Ear care initiated, instructions on AVS    Cricket Olguin Jr, MD  12/23/2019  3:42 PM

## 2020-01-08 ENCOUNTER — TELEPHONE (OUTPATIENT)
Dept: INTERNAL MEDICINE | Facility: CLINIC | Age: 60
End: 2020-01-08

## 2020-01-08 ENCOUNTER — HOSPITAL ENCOUNTER (OUTPATIENT)
Dept: CT IMAGING | Facility: HOSPITAL | Age: 60
Discharge: HOME | End: 2020-01-08
Admitting: OTOLARYNGOLOGY

## 2020-01-08 DIAGNOSIS — J31.0 RHINITIS, CHRONIC: ICD-10-CM

## 2020-01-08 DIAGNOSIS — J32.9 CHRONIC SINUSITIS, UNSPECIFIED LOCATION: ICD-10-CM

## 2020-01-08 DIAGNOSIS — J34.2 ACQUIRED DEVIATED NASAL SEPTUM: ICD-10-CM

## 2020-01-08 DIAGNOSIS — R05.9 COUGH: Primary | ICD-10-CM

## 2020-01-08 PROCEDURE — 70486 CT MAXILLOFACIAL W/O DYE: CPT

## 2020-01-08 NOTE — TELEPHONE ENCOUNTER
Pt contacted Dr. French stating he wasn't any better w/ cough.  He would like something prescribed.  Since she is out of the office, Dr. French asked if Dr. Spencer or Kristan Beebe could call in something to [facility].    Tussin/ Murtaza w/Velma

## 2020-01-08 NOTE — TELEPHONE ENCOUNTER
I called and spoke with him. He has been taking Delsym and has not been able to sleep. I sent in Tussionex #45 ml to [facility]. He has another appointment with Dr. Olguin on Monday to go over his CT of the sinuses.

## 2020-01-13 ENCOUNTER — OFFICE VISIT (OUTPATIENT)
Dept: OTOLARYNGOLOGY | Facility: CLINIC | Age: 60
End: 2020-01-13

## 2020-01-13 VITALS
HEIGHT: 72 IN | DIASTOLIC BLOOD PRESSURE: 75 MMHG | SYSTOLIC BLOOD PRESSURE: 126 MMHG | WEIGHT: 220 LBS | TEMPERATURE: 103 F | HEART RATE: 103 BPM | BODY MASS INDEX: 29.8 KG/M2

## 2020-01-13 DIAGNOSIS — H61.21 CERUMEN DEBRIS ON TYMPANIC MEMBRANE, RIGHT: ICD-10-CM

## 2020-01-13 DIAGNOSIS — H62.41 OTITIS EXTERNA, FUNGAL, RIGHT EAR: ICD-10-CM

## 2020-01-13 DIAGNOSIS — J32.9 CHRONIC SINUSITIS, UNSPECIFIED LOCATION: Chronic | ICD-10-CM

## 2020-01-13 DIAGNOSIS — J31.0 RHINITIS, CHRONIC: Primary | ICD-10-CM

## 2020-01-13 DIAGNOSIS — J34.3 HYPERTROPHY OF BOTH INFERIOR NASAL TURBINATES: ICD-10-CM

## 2020-01-13 DIAGNOSIS — J34.2 ACQUIRED DEVIATED NASAL SEPTUM: ICD-10-CM

## 2020-01-13 DIAGNOSIS — B36.9 OTITIS EXTERNA, FUNGAL, RIGHT EAR: ICD-10-CM

## 2020-01-13 PROCEDURE — 99214 OFFICE O/P EST MOD 30 MIN: CPT | Performed by: OTOLARYNGOLOGY

## 2020-01-13 PROCEDURE — 69210 REMOVE IMPACTED EAR WAX UNI: CPT | Performed by: OTOLARYNGOLOGY

## 2020-01-13 NOTE — PROGRESS NOTES
Nelida Ricks LPN   Patient Intake Note    Review of Systems  Review of Systems   Constitutional: Negative for diaphoresis and fatigue.   HENT:        See HPI   Respiratory: Positive for cough. Negative for choking and shortness of breath.    Gastrointestinal: Negative for diarrhea, nausea and vomiting.   Neurological: Negative for dizziness, light-headedness and headaches.   Psychiatric/Behavioral: Negative for sleep disturbance.       Tobacco Use: Screening and Cessation Intervention  Social History    Tobacco Use      Smoking status: Former Smoker        Types: Cigarettes        Quit date: 1998        Years since quittin.0      Smokeless tobacco: Never Used      Nelida Ricks LPN  2020  2:04 PM

## 2020-01-13 NOTE — PROGRESS NOTES
ENT PROCEDURE NOTE      Pre-operative Diagnosis: Cerumen impaction     Post-operative Diagnosis: same     Anesthesia: none     Procedure:  Binocular ear microscopy with cerumen removal     Side:  Left Ear     Procedure Details:    The patient was placed supine on the procedure table. Using a speculum, the EAR SIDE: Left ears were  examined with the microscope.   Using Micro-instrumentation and suction, the cerumen was cleared. Oil and suction was used     Findings:   Ear Canal: AD obstructed with cerumen over tympanic membrane and fungus present  Tympanic Membrane:  AD-Thickened, moist, mildly red, no infection  Ossicular Chain: intact AD  Middle ear space: not well seen AD  Hearing: improved after cleaning     Condition:  Stable.  Patient tolerated procedure well.     Complications:  None     Post-procedure instructions reviewed with Patient  Ear care initiated, instructions on AVS

## 2020-01-13 NOTE — PATIENT INSTRUCTIONS
EAR CARE: :using oil  Use a hair dryer on low heat and blow into the ear canals 2 times daily to keep ears dry.  DO Not use Q tips or Dinora pins, ever!!  Do not use alcohol in the ear canal (this will cause dryness and itching)  NO peroxide or alcohol in ears  Oil: Use Sweet oil, Olive oil, or Mineral oil, purchased over the counter, once every other day, Do not use Q tips, You may use a hair dryer on low heat. Blow in ears for 10-15 seconds 2 times daily to dry ear canals or if ear canals are itching.    Use plain white vinegar, Alternate Vinegar with oil, once every other day    PREOPERATIVE SURGERY/PROCEDURE INSTRUCTIONS:  Do not eat or drink ANYTHING after midnight, unless instructed     Clean the operative site by showering with an antibacterial soap (like Dial, Dove, Ivory, etc) and shampooing hair    Preoperative scrub for Surgery:  Skin: Antibacterial soap (Dial, Ivory, Dove) shower daily, including hair.  Be careful not to get into eyes  Do this daily for 5 days    Mouth: Oral rinse with Peroxide/Mouthwash solution mixed 50:50, OR use Listerine (this is antibacterial) solution 2 times daily for 5 days      Do NOT pluck, shave hair on skin the night prior to operation    If you are diabetic, take your blood sugar the night before and in the morning prior to coming to hospital and give results to nurse and the anesthesiologist    Remove any metallic piercings prior to surgery. You may wear plastic spacers if needed.    Do NOT apply eye makeup Morning of surgery    Please remove fingernail polish prior to surgery    STOP:  -   All natural/homeopathic medications 2 weeks prior to surgery, Ask about over the counter medications  -   Smoking 2 weeks prior to surgery  -   Blood thinners- 3-5 days prior to surgery (or as instructed by doctor)  Bring with you the morning of surgery:  -   Preoperative paperwork  -   Insurance card  -   Identification with photo  -   Home medications or up to date list    Cricket GREENFIELD  Sharif Morgan MD has explained the risks, benefits and alternatives to the patient/patient’s representative, in clear and simple language.  Time was allowed for questions.  Risks of procedure include but are not limited to:    As a result of this procedure being performed, the material risks generally recognized are INFECTION, ALLERGIC REACTION, RISK OF ANESTHESIA, SEVERE LOSS OF BLOOD, LOSS OR LOSS OF FUNCTION OF ANY LIMB OR ORGAN, PARALYSIS OR PARTIAL PARALYSIS, PARAPLEGIA OR QUADRIPLEGIA, DISFIGURING SCAR, BRAIN DAMAGE, CARDIAC ARREST OR DEATH, BLOOD LOSS NECESSITATING TRANSFUSION WHICH CARRIES THE RISK OF EXPOSURE TO AIDS, HEPATITIS OR OTHER INFECTIOUS DISEASES.      Procedure: Sinus surgery, Functional Endoscopic sinus surgery and Balloon Sinuplasty    Risks specific for procedure: pain, bleeding (with the possible need for nasal packing), infection, risks of the anesthesia, possible incomplete response to anesthesia requiring a conversion to a general anesthesia at a later date, orbital injury with blurred vision or visual loss, cerebrospinal fluid leak, injury to the brain, meningitis, intracranial bleeding, stroke, persistent disease and/ or symptoms, scarring, synechiae and the possibility for the need of reoperation. Possibilities of an inability to canulate and dilate the sinuses at the time of the operation.  Possibilities of additional sinus work or less sinus work depending on the status of the nose at the time of the operation was discussed    No guarantees of outcome given or implied  Patient demonstrate understanding    Patient does wish to  proceed with proposed procedure     [email or web address]

## 2020-01-13 NOTE — PROGRESS NOTES
Cricket Olguin Jr, MD     FOLLOW UP NOTE     Chief Complaint   Patient presents with   • Follow-up        HISTORY OF PRESENT ILLNESS:   Accompanied by:  No one  Chente Campbell is a  59 y.o. male who is here for follow up. Sinus CT completed.  R ear is stopped up.  He has not felt good.   No fever.    Review of Systems  Reviewed per patient intake note and confirmed by me    Past History:  Past medical and surgical history, family history and social history reviewed and updated when appropriate.  Current medications and allergies reviewed and updated when appropriate.  Allergies:  Sulfa antibiotics and Penicillins        Vital Signs:   Temp:  [103 °F (39.4 °C)] 103 °F (39.4 °C)  Heart Rate:  [103] 103  BP: (126)/(75) 126/75    EXAMINATION:   CONSTITUTIONAL:    well nourished, well-developed, alert, oriented, in no acute distress      BODY HABITUS:    Normal body habitus     COMMUNICATION:    able to communicate normally, normal voice quality     HEAD:     Normocephalic, without obvious abnormality, atraumatic     FACE:    structure normal, no tenderness present, no lesions/masses, no evidence of trauma     SALIVARY GLANDS:    parotid glands with no tenderness, no swelling, no masses, submandibular glands with normal size, nontender      EYE:    ocular motility normal, eyelids normal, orbits normal, no proptosis, conjunctiva clear, sclera non-icteric, pupils equal, round, reactive to light and accomodation     HEARING:    response to conversational voice normal     EARS:    Otoscopic exam   Microscopic exam- See procedure note     NOSE EXTERNAL:    APPEARANCE: normal, straight, with good projection, no tenderness, no lesions, no tenderness, good nasal support, patent nares     NOSE INTERNAL: Anterior rhininoscopy     NASALMUCOSA:    abnormal:        Bilateral- red, dry thick    NASAL PASSAGES:     abnormal   Left- partially obstructed by septum, Right- very narrowed   NASAL VALVE:     intact, good support and no  nasal obstruction   SEPTUM:     mucosa abnormal   Bilateral- red, dry, thick     deviation present:      deviated Left low/mid shelf almost touching IT   INFERIOR TURBINATES:     abnormal  Bilateral- red, thick, enlarged      ORAL CAVITY:    Normal lips with no lesions, dentition normal for age, FOM intact without lesions and normal salivary flow, Mucosa intact without lesions, Hard and soft palate normal without lesions     OROPHARYNX:    Direct examination  oropharyngeal mucosa normal, tonsil(s) with normal appearance       NECK:    short, thick  moderate   Neck position: lordotic     LYMPH NODES :    no adenopathy     THYROID:    no overt thyromegaly, no tenderness, nodules or mass present on palpation, position midline     CHEST/RESPIRATORY:    respiratory effort normal, no rales, rubs or wheezing, no stridor, normal appearance to chest     CARDIOVASCULAR:    regular rate and rhythm, no murmurs, gallups, no peripheral edema     NEURO/PSYCHIATRIC :    oriented appropriately for age, mood normal, affect appropriate, cranial nerves intact grossly (unless specifically described), gait normal for age    RESULTS REVIEW:    I have personally reviewed the patient's ct scan of the sinuses without contrast images.  I have personally reviewed the patient's ct scan of the sinuses without contrast report.    CT sinus 12/23/2019       ASSESSMENT:      Diagnosis Plan   1. Rhinitis, chronic  Case Request    Case Request    No change   2. Chronic sinusitis, unspecified location  Case Request    Case Request    L maxillary  L ethmoid   3. Acquired deviated nasal septum  Case Request    Case Request    L to R anterior mid moderate   4. Cerumen debris on tympanic membrane, right      Persistent   5. Hypertrophy of both inferior nasal turbinates  Case Request    Case Request    with obstruction   6. Otitis externa, fungal, right ear      Cleaned today           PLAN:   Medical and surgical options were discussed including  observation, continued medical management, medication modification and surgical management. Risks, benefits and alternatives were discussed and questions were answered. After considering the options, the patient decided to proceed with surgical management.  Patient has persistent symptoms c/w chronic sinusitis, DNS, turbinate obstruction. He has failed medical therapy. He would benefit from Surgery, based on CT findings L sided max and Ethmoid disease. I recommend septal and turbinate surgery based on current findings.  Ear- R ear with Fungal OE, Ear care with oil and vinegar started  Ear care- oil and Vinegar alternating  Continue flonase  Continue Saline  MY CHART:  Patient is Patient is using My Chart     Orders Placed This Encounter   Procedures   • Follow Anesthesia Guidelines / Standing Orders   • Provide Patient With Instructions on NPO Status    Preop Clearance by Dr French  Medical and surgical options were discussed including observation versus surgical management. Risks, benefits and alternatives were discussed and questions were answered. A functional endoscopic sinus surgery was felt to be indicated due to the patient's history of chronic rhinosinusitis (>12 weeks) with CT scan evidence of sinus opacification and deviates eptim, turbinate hypertrophy and failure of medical management including nasal lavage, prescription antihistamines and prescription nasal sprays and oral medications. After considering the options, it was decided that surgery was the best option.    PLANNED SURGERY:  1. functional endoscopic sinus surgery  2. left endoscopic maxillary antrostomy  3. left endoscopic partial ethmoidectomy  4. Septoplasty, Bilateral turbinoplasty     INFORMED CONSENT DISCUSSION:  FUNCTIONAL ENDOSCOPIC SINUS SURGERY: A functional endoscopic sinus surgery was recommended. The risks and benefits were explained including but not limited to pain, bleeding (with the possible need for nasal packing), infection,  risks of the general anesthesia, orbital injury with blurred vision or visual loss, cerebrospinal fluid leak, persistent disease, scarring, synichiae and the possibility for the need of reoperation. Possibilities of additional sinus work or less sinus work depending on the status of the nose at the time of the operation was discussed. Alternatives were discussed. No guarantees were made or implied. Questions were asked appropriately answered.      PREOPERATIVE WORKUP:   1. labs/ workup per anesthesia  2. Preoperative Medicine evaluation for clearance    MEDICATIONS TO START 4-5 DAYS PRIOR TO SURGERY:  No orders of the defined types were placed in this encounter.       PREOPERATIVE MEDICATIONS  1. Afrin 2 puffs in holding room  2. Decadron 10 mg IV in holding room    Patient understand(s) and agree(s) with the treatment plan as described.    Return 7-10 days after surgery, for Recheck nose.     Cricket Olguin Jr, MD  01/13/20  2:12 PM

## 2020-01-17 PROBLEM — J34.3 HYPERTROPHY OF BOTH INFERIOR NASAL TURBINATES: Status: ACTIVE | Noted: 2020-01-17

## 2020-01-17 PROBLEM — J31.0 RHINITIS, CHRONIC: Status: ACTIVE | Noted: 2020-01-17

## 2020-01-17 PROBLEM — J32.9 CHRONIC SINUSITIS: Status: ACTIVE | Noted: 2020-01-17

## 2020-01-17 PROBLEM — J34.2 ACQUIRED DEVIATED NASAL SEPTUM: Status: ACTIVE | Noted: 2020-01-17

## 2020-01-30 ENCOUNTER — APPOINTMENT (OUTPATIENT)
Dept: PREADMISSION TESTING | Facility: HOSPITAL | Age: 60
End: 2020-01-30

## 2020-01-30 VITALS
WEIGHT: 224.4 LBS | OXYGEN SATURATION: 97 % | BODY MASS INDEX: 30.4 KG/M2 | DIASTOLIC BLOOD PRESSURE: 79 MMHG | SYSTOLIC BLOOD PRESSURE: 146 MMHG | RESPIRATION RATE: 16 BRPM | HEART RATE: 86 BPM | HEIGHT: 72 IN

## 2020-01-30 LAB
ANION GAP SERPL CALCULATED.3IONS-SCNC: 13 MMOL/L (ref 5–15)
BUN BLD-MCNC: 17 MG/DL (ref 6–20)
BUN/CREAT SERPL: 23.9 (ref 7–25)
CALCIUM SPEC-SCNC: 9.2 MG/DL (ref 8.6–10.5)
CHLORIDE SERPL-SCNC: 103 MMOL/L (ref 98–107)
CO2 SERPL-SCNC: 23 MMOL/L (ref 22–29)
CREAT BLD-MCNC: 0.71 MG/DL (ref 0.76–1.27)
DEPRECATED RDW RBC AUTO: 39.7 FL (ref 37–54)
ERYTHROCYTE [DISTWIDTH] IN BLOOD BY AUTOMATED COUNT: 12.3 % (ref 12.3–15.4)
GFR SERPL CREATININE-BSD FRML MDRD: 114 ML/MIN/1.73
GLUCOSE BLD-MCNC: 127 MG/DL (ref 65–99)
HCT VFR BLD AUTO: 41.5 % (ref 37.5–51)
HGB BLD-MCNC: 15.1 G/DL (ref 13–17.7)
MCH RBC QN AUTO: 32.1 PG (ref 26.6–33)
MCHC RBC AUTO-ENTMCNC: 36.4 G/DL (ref 31.5–35.7)
MCV RBC AUTO: 88.1 FL (ref 79–97)
PLATELET # BLD AUTO: 168 10*3/MM3 (ref 140–450)
PMV BLD AUTO: 10.6 FL (ref 6–12)
POTASSIUM BLD-SCNC: 4.1 MMOL/L (ref 3.5–5.2)
RBC # BLD AUTO: 4.71 10*6/MM3 (ref 4.14–5.8)
SODIUM BLD-SCNC: 139 MMOL/L (ref 136–145)
WBC NRBC COR # BLD: 5.79 10*3/MM3 (ref 3.4–10.8)

## 2020-01-30 PROCEDURE — 93010 ELECTROCARDIOGRAM REPORT: CPT | Performed by: INTERNAL MEDICINE

## 2020-01-30 PROCEDURE — 36415 COLL VENOUS BLD VENIPUNCTURE: CPT

## 2020-01-30 PROCEDURE — 93005 ELECTROCARDIOGRAM TRACING: CPT

## 2020-01-30 PROCEDURE — 85027 COMPLETE CBC AUTOMATED: CPT | Performed by: OTOLARYNGOLOGY

## 2020-01-30 PROCEDURE — 80048 BASIC METABOLIC PNL TOTAL CA: CPT | Performed by: OTOLARYNGOLOGY

## 2020-02-05 ENCOUNTER — TELEPHONE (OUTPATIENT)
Dept: OTOLARYNGOLOGY | Facility: CLINIC | Age: 60
End: 2020-02-05

## 2020-02-05 NOTE — TELEPHONE ENCOUNTER
Called patient about arrival time and nothing to eat or drink after midnight. He already knew how to get to registration.

## 2020-02-06 ENCOUNTER — ANESTHESIA (OUTPATIENT)
Dept: PERIOP | Facility: HOSPITAL | Age: 60
End: 2020-02-06

## 2020-02-06 ENCOUNTER — HOSPITAL ENCOUNTER (OUTPATIENT)
Facility: HOSPITAL | Age: 60
Setting detail: HOSPITAL OUTPATIENT SURGERY
Discharge: HOME | End: 2020-02-06
Attending: OTOLARYNGOLOGY | Admitting: OTOLARYNGOLOGY

## 2020-02-06 ENCOUNTER — ANESTHESIA EVENT (OUTPATIENT)
Dept: PERIOP | Facility: HOSPITAL | Age: 60
End: 2020-02-06

## 2020-02-06 VITALS
SYSTOLIC BLOOD PRESSURE: 148 MMHG | OXYGEN SATURATION: 95 % | HEART RATE: 98 BPM | DIASTOLIC BLOOD PRESSURE: 92 MMHG | TEMPERATURE: 97.5 F | RESPIRATION RATE: 16 BRPM

## 2020-02-06 DIAGNOSIS — J31.0 RHINITIS, CHRONIC: ICD-10-CM

## 2020-02-06 DIAGNOSIS — J34.2 ACQUIRED DEVIATED NASAL SEPTUM: ICD-10-CM

## 2020-02-06 DIAGNOSIS — J32.9 CHRONIC SINUSITIS, UNSPECIFIED LOCATION: ICD-10-CM

## 2020-02-06 DIAGNOSIS — Z98.890 S/P SINUS SURGERY: ICD-10-CM

## 2020-02-06 DIAGNOSIS — Z98.890 S/P NASAL SEPTOPLASTY: Primary | ICD-10-CM

## 2020-02-06 DIAGNOSIS — J34.3 HYPERTROPHY OF BOTH INFERIOR NASAL TURBINATES: ICD-10-CM

## 2020-02-06 PROCEDURE — 88311 DECALCIFY TISSUE: CPT | Performed by: OTOLARYNGOLOGY

## 2020-02-06 PROCEDURE — 25010000002 DEXAMETHASONE PER 1 MG: Performed by: NURSE ANESTHETIST, CERTIFIED REGISTERED

## 2020-02-06 PROCEDURE — 25010000002 PROPOFOL 10 MG/ML EMULSION: Performed by: NURSE ANESTHETIST, CERTIFIED REGISTERED

## 2020-02-06 PROCEDURE — 88305 TISSUE EXAM BY PATHOLOGIST: CPT | Performed by: OTOLARYNGOLOGY

## 2020-02-06 PROCEDURE — 25010000002 COCAINE HCL 40 MG/ML SOLUTION: Performed by: OTOLARYNGOLOGY

## 2020-02-06 PROCEDURE — 31255 NSL/SINS NDSC W/TOT ETHMDCT: CPT | Performed by: OTOLARYNGOLOGY

## 2020-02-06 PROCEDURE — C9046 COCAINE HCL NASAL SOLUTION: HCPCS | Performed by: OTOLARYNGOLOGY

## 2020-02-06 PROCEDURE — C2625 STENT, NON-COR, TEM W/DEL SY: HCPCS | Performed by: OTOLARYNGOLOGY

## 2020-02-06 PROCEDURE — 25010000002 FENTANYL CITRATE (PF) 100 MCG/2ML SOLUTION: Performed by: NURSE ANESTHETIST, CERTIFIED REGISTERED

## 2020-02-06 PROCEDURE — 25010000002 ONDANSETRON PER 1 MG: Performed by: OTOLARYNGOLOGY

## 2020-02-06 PROCEDURE — 30140 RESECT INFERIOR TURBINATE: CPT | Performed by: OTOLARYNGOLOGY

## 2020-02-06 PROCEDURE — 25010000002 NEOSTIGMINE 10 MG/10ML SOLUTION 10 ML VIAL: Performed by: NURSE ANESTHETIST, CERTIFIED REGISTERED

## 2020-02-06 PROCEDURE — 25010000002 ONDANSETRON PER 1 MG: Performed by: NURSE ANESTHETIST, CERTIFIED REGISTERED

## 2020-02-06 PROCEDURE — 31267 ENDOSCOPY MAXILLARY SINUS: CPT | Performed by: OTOLARYNGOLOGY

## 2020-02-06 PROCEDURE — 30520 REPAIR OF NASAL SEPTUM: CPT | Performed by: OTOLARYNGOLOGY

## 2020-02-06 DEVICE — PROPEL SINUS IMPLANT
Type: IMPLANTABLE DEVICE | Status: FUNCTIONAL
Brand: PROPEL

## 2020-02-06 RX ORDER — OXYCODONE AND ACETAMINOPHEN 10; 325 MG/1; MG/1
1 TABLET ORAL ONCE AS NEEDED
Status: DISCONTINUED | OUTPATIENT
Start: 2020-02-06 | End: 2020-02-06 | Stop reason: HOSPADM

## 2020-02-06 RX ORDER — SODIUM CHLORIDE 0.9 % (FLUSH) 0.9 %
10 SYRINGE (ML) INJECTION AS NEEDED
Status: DISCONTINUED | OUTPATIENT
Start: 2020-02-06 | End: 2020-02-06 | Stop reason: HOSPADM

## 2020-02-06 RX ORDER — SODIUM CHLORIDE, SODIUM LACTATE, POTASSIUM CHLORIDE, CALCIUM CHLORIDE 600; 310; 30; 20 MG/100ML; MG/100ML; MG/100ML; MG/100ML
INJECTION, SOLUTION INTRAVENOUS CONTINUOUS PRN
Status: COMPLETED | OUTPATIENT
Start: 2020-02-06 | End: 2020-02-06

## 2020-02-06 RX ORDER — SODIUM CHLORIDE, SODIUM LACTATE, POTASSIUM CHLORIDE, CALCIUM CHLORIDE 600; 310; 30; 20 MG/100ML; MG/100ML; MG/100ML; MG/100ML
9 INJECTION, SOLUTION INTRAVENOUS CONTINUOUS
Status: DISCONTINUED | OUTPATIENT
Start: 2020-02-06 | End: 2020-02-06 | Stop reason: HOSPADM

## 2020-02-06 RX ORDER — MIDAZOLAM HYDROCHLORIDE 1 MG/ML
2 INJECTION INTRAMUSCULAR; INTRAVENOUS
Status: DISCONTINUED | OUTPATIENT
Start: 2020-02-06 | End: 2020-02-06 | Stop reason: HOSPADM

## 2020-02-06 RX ORDER — MIDAZOLAM HYDROCHLORIDE 1 MG/ML
1 INJECTION INTRAMUSCULAR; INTRAVENOUS
Status: DISCONTINUED | OUTPATIENT
Start: 2020-02-06 | End: 2020-02-06 | Stop reason: HOSPADM

## 2020-02-06 RX ORDER — FENTANYL CITRATE 50 UG/ML
INJECTION, SOLUTION INTRAMUSCULAR; INTRAVENOUS AS NEEDED
Status: DISCONTINUED | OUTPATIENT
Start: 2020-02-06 | End: 2020-02-06 | Stop reason: SURG

## 2020-02-06 RX ORDER — ONDANSETRON 2 MG/ML
4 INJECTION INTRAMUSCULAR; INTRAVENOUS ONCE AS NEEDED
Status: COMPLETED | OUTPATIENT
Start: 2020-02-06 | End: 2020-02-06

## 2020-02-06 RX ORDER — OXYCODONE AND ACETAMINOPHEN 7.5; 325 MG/1; MG/1
2 TABLET ORAL EVERY 4 HOURS PRN
Status: DISCONTINUED | OUTPATIENT
Start: 2020-02-06 | End: 2020-02-06 | Stop reason: HOSPADM

## 2020-02-06 RX ORDER — LIDOCAINE HYDROCHLORIDE 10 MG/ML
0.5 INJECTION, SOLUTION EPIDURAL; INFILTRATION; INTRACAUDAL; PERINEURAL ONCE AS NEEDED
Status: DISCONTINUED | OUTPATIENT
Start: 2020-02-06 | End: 2020-02-06 | Stop reason: HOSPADM

## 2020-02-06 RX ORDER — ONDANSETRON 2 MG/ML
4 INJECTION INTRAMUSCULAR; INTRAVENOUS ONCE AS NEEDED
Status: DISCONTINUED | OUTPATIENT
Start: 2020-02-06 | End: 2020-02-06 | Stop reason: HOSPADM

## 2020-02-06 RX ORDER — ONDANSETRON 2 MG/ML
INJECTION INTRAMUSCULAR; INTRAVENOUS AS NEEDED
Status: DISCONTINUED | OUTPATIENT
Start: 2020-02-06 | End: 2020-02-06 | Stop reason: SURG

## 2020-02-06 RX ORDER — COCAINE HYDROCHLORIDE 40 MG/ML
SOLUTION NASAL
Status: DISCONTINUED
Start: 2020-02-06 | End: 2020-02-06 | Stop reason: HOSPADM

## 2020-02-06 RX ORDER — NEOSTIGMINE METHYLSULFATE 1 MG/ML
INJECTION, SOLUTION INTRAVENOUS AS NEEDED
Status: DISCONTINUED | OUTPATIENT
Start: 2020-02-06 | End: 2020-02-06 | Stop reason: SURG

## 2020-02-06 RX ORDER — EPHEDRINE SULFATE 50 MG/ML
INJECTION INTRAVENOUS AS NEEDED
Status: DISCONTINUED | OUTPATIENT
Start: 2020-02-06 | End: 2020-02-06 | Stop reason: SURG

## 2020-02-06 RX ORDER — SODIUM CHLORIDE 0.9 % (FLUSH) 0.9 %
10 SYRINGE (ML) INJECTION EVERY 12 HOURS SCHEDULED
Status: DISCONTINUED | OUTPATIENT
Start: 2020-02-06 | End: 2020-02-06 | Stop reason: HOSPADM

## 2020-02-06 RX ORDER — GLYCOPYRROLATE 0.2 MG/ML
INJECTION INTRAMUSCULAR; INTRAVENOUS AS NEEDED
Status: DISCONTINUED | OUTPATIENT
Start: 2020-02-06 | End: 2020-02-06 | Stop reason: SURG

## 2020-02-06 RX ORDER — PROPOFOL 10 MG/ML
VIAL (ML) INTRAVENOUS AS NEEDED
Status: DISCONTINUED | OUTPATIENT
Start: 2020-02-06 | End: 2020-02-06 | Stop reason: SURG

## 2020-02-06 RX ORDER — DEXTROSE MONOHYDRATE 25 G/50ML
12.5 INJECTION, SOLUTION INTRAVENOUS AS NEEDED
Status: DISCONTINUED | OUTPATIENT
Start: 2020-02-06 | End: 2020-02-06 | Stop reason: HOSPADM

## 2020-02-06 RX ORDER — FENTANYL CITRATE 50 UG/ML
25 INJECTION, SOLUTION INTRAMUSCULAR; INTRAVENOUS AS NEEDED
Status: DISCONTINUED | OUTPATIENT
Start: 2020-02-06 | End: 2020-02-06 | Stop reason: HOSPADM

## 2020-02-06 RX ORDER — IBUPROFEN 600 MG/1
600 TABLET ORAL ONCE AS NEEDED
Status: DISCONTINUED | OUTPATIENT
Start: 2020-02-06 | End: 2020-02-06 | Stop reason: HOSPADM

## 2020-02-06 RX ORDER — LIDOCAINE HYDROCHLORIDE AND EPINEPHRINE 10; 10 MG/ML; UG/ML
INJECTION, SOLUTION INFILTRATION; PERINEURAL AS NEEDED
Status: DISCONTINUED | OUTPATIENT
Start: 2020-02-06 | End: 2020-02-06 | Stop reason: HOSPADM

## 2020-02-06 RX ORDER — DEXAMETHASONE SODIUM PHOSPHATE 4 MG/ML
INJECTION, SOLUTION INTRA-ARTICULAR; INTRALESIONAL; INTRAMUSCULAR; INTRAVENOUS; SOFT TISSUE AS NEEDED
Status: DISCONTINUED | OUTPATIENT
Start: 2020-02-06 | End: 2020-02-06 | Stop reason: SURG

## 2020-02-06 RX ORDER — HYDROCODONE BITARTRATE AND ACETAMINOPHEN 5; 325 MG/1; MG/1
1 TABLET ORAL EVERY 4 HOURS PRN
Qty: 15 TABLET | Refills: 0 | Status: SHIPPED | OUTPATIENT
Start: 2020-02-06 | End: 2020-02-09

## 2020-02-06 RX ORDER — HYDROCODONE BITARTRATE AND ACETAMINOPHEN 7.5; 325 MG/1; MG/1
1 TABLET ORAL EVERY 4 HOURS PRN
Qty: 20 TABLET | Refills: 0 | Status: SHIPPED | OUTPATIENT
Start: 2020-02-06 | End: 2020-02-09

## 2020-02-06 RX ORDER — ROCURONIUM BROMIDE 10 MG/ML
INJECTION, SOLUTION INTRAVENOUS AS NEEDED
Status: DISCONTINUED | OUTPATIENT
Start: 2020-02-06 | End: 2020-02-06 | Stop reason: SURG

## 2020-02-06 RX ORDER — LABETALOL HYDROCHLORIDE 5 MG/ML
5 INJECTION, SOLUTION INTRAVENOUS
Status: DISCONTINUED | OUTPATIENT
Start: 2020-02-06 | End: 2020-02-06 | Stop reason: HOSPADM

## 2020-02-06 RX ORDER — NALOXONE HCL 0.4 MG/ML
0.4 VIAL (ML) INJECTION AS NEEDED
Status: DISCONTINUED | OUTPATIENT
Start: 2020-02-06 | End: 2020-02-06 | Stop reason: HOSPADM

## 2020-02-06 RX ORDER — HYDROCODONE BITARTRATE AND ACETAMINOPHEN 7.5; 325 MG/1; MG/1
1 TABLET ORAL EVERY 4 HOURS PRN
Qty: 20 TABLET | Refills: 0 | Status: SHIPPED | OUTPATIENT
Start: 2020-02-06 | End: 2020-02-10

## 2020-02-06 RX ORDER — COCAINE HYDROCHLORIDE 40 MG/ML
SOLUTION NASAL AS NEEDED
Status: DISCONTINUED | OUTPATIENT
Start: 2020-02-06 | End: 2020-02-06 | Stop reason: HOSPADM

## 2020-02-06 RX ORDER — FENTANYL CITRATE 50 UG/ML
25 INJECTION, SOLUTION INTRAMUSCULAR; INTRAVENOUS
Status: DISCONTINUED | OUTPATIENT
Start: 2020-02-06 | End: 2020-02-06 | Stop reason: HOSPADM

## 2020-02-06 RX ORDER — OXYMETAZOLINE HYDROCHLORIDE 0.05 G/100ML
SPRAY NASAL AS NEEDED
Status: DISCONTINUED | OUTPATIENT
Start: 2020-02-06 | End: 2020-02-06 | Stop reason: HOSPADM

## 2020-02-06 RX ORDER — SODIUM CHLORIDE, SODIUM LACTATE, POTASSIUM CHLORIDE, CALCIUM CHLORIDE 600; 310; 30; 20 MG/100ML; MG/100ML; MG/100ML; MG/100ML
1000 INJECTION, SOLUTION INTRAVENOUS CONTINUOUS
Status: DISCONTINUED | OUTPATIENT
Start: 2020-02-06 | End: 2020-02-06 | Stop reason: HOSPADM

## 2020-02-06 RX ORDER — IPRATROPIUM BROMIDE AND ALBUTEROL SULFATE 2.5; .5 MG/3ML; MG/3ML
3 SOLUTION RESPIRATORY (INHALATION) ONCE AS NEEDED
Status: DISCONTINUED | OUTPATIENT
Start: 2020-02-06 | End: 2020-02-06 | Stop reason: HOSPADM

## 2020-02-06 RX ORDER — MAGNESIUM HYDROXIDE 1200 MG/15ML
LIQUID ORAL AS NEEDED
Status: DISCONTINUED | OUTPATIENT
Start: 2020-02-06 | End: 2020-02-06 | Stop reason: HOSPADM

## 2020-02-06 RX ORDER — SODIUM CHLORIDE 0.9 % (FLUSH) 0.9 %
3 SYRINGE (ML) INJECTION AS NEEDED
Status: DISCONTINUED | OUTPATIENT
Start: 2020-02-06 | End: 2020-02-06 | Stop reason: HOSPADM

## 2020-02-06 RX ADMIN — VASOPRESSIN 1 ML: 20 INJECTION INTRAVENOUS at 09:54

## 2020-02-06 RX ADMIN — ROCURONIUM BROMIDE 50 MG: 10 INJECTION INTRAVENOUS at 09:10

## 2020-02-06 RX ADMIN — EPHEDRINE SULFATE 20 MG: 50 INJECTION INTRAVENOUS at 10:01

## 2020-02-06 RX ADMIN — ROCURONIUM BROMIDE 20 MG: 10 INJECTION INTRAVENOUS at 10:36

## 2020-02-06 RX ADMIN — PROPOFOL 200 MG: 10 INJECTION, EMULSION INTRAVENOUS at 09:10

## 2020-02-06 RX ADMIN — GLYCOPYRROLATE 0.2 MG: 0.2 INJECTION, SOLUTION INTRAMUSCULAR; INTRAVENOUS at 11:36

## 2020-02-06 RX ADMIN — VASOPRESSIN 1 ML: 20 INJECTION INTRAVENOUS at 09:42

## 2020-02-06 RX ADMIN — ROCURONIUM BROMIDE 20 MG: 10 INJECTION INTRAVENOUS at 10:09

## 2020-02-06 RX ADMIN — ONDANSETRON HYDROCHLORIDE 4 MG: 2 SOLUTION INTRAMUSCULAR; INTRAVENOUS at 09:20

## 2020-02-06 RX ADMIN — SODIUM CHLORIDE, POTASSIUM CHLORIDE, SODIUM LACTATE AND CALCIUM CHLORIDE 1000 ML: 600; 310; 30; 20 INJECTION, SOLUTION INTRAVENOUS at 08:08

## 2020-02-06 RX ADMIN — NEOSTIGMINE METHYLSULFATE 2 MG: 1 INJECTION INTRAVENOUS at 11:36

## 2020-02-06 RX ADMIN — FENTANYL CITRATE 100 MCG: 50 INJECTION, SOLUTION INTRAMUSCULAR; INTRAVENOUS at 09:10

## 2020-02-06 RX ADMIN — DEXMEDETOMIDINE 0.2 MCG/KG/HR: 100 INJECTION, SOLUTION, CONCENTRATE INTRAVENOUS at 09:20

## 2020-02-06 RX ADMIN — DEXAMETHASONE SODIUM PHOSPHATE 4 MG: 4 INJECTION, SOLUTION INTRAMUSCULAR; INTRAVENOUS at 09:20

## 2020-02-06 RX ADMIN — ONDANSETRON HYDROCHLORIDE 4 MG: 2 SOLUTION INTRAMUSCULAR; INTRAVENOUS at 12:53

## 2020-02-06 RX ADMIN — SODIUM CHLORIDE, POTASSIUM CHLORIDE, SODIUM LACTATE AND CALCIUM CHLORIDE: 600; 310; 30; 20 INJECTION, SOLUTION INTRAVENOUS at 10:39

## 2020-02-06 NOTE — ANESTHESIA POSTPROCEDURE EVALUATION
Patient: Chente Campbell    Procedure Summary     Date:  02/06/20 Room / Location:   [facility] /  [facility]    Anesthesia Start:  0908 Anesthesia Stop:  1149    Procedures:       ENDOSCOPIC FUNCTIONAL SINUS SURGERY WITH SEPTOPLASTY AND RESECTION INFERIOR TURBINATES (Bilateral Nose)      RESECTION OF NASAL TURBINATES (Bilateral Nose) Diagnosis:       Chronic sinusitis, unspecified location      Rhinitis, chronic      Acquired deviated nasal septum      Hypertrophy of both inferior nasal turbinates      (Chronic sinusitis, unspecified location [J32.9])      (Rhinitis, chronic [J31.0])      (Acquired deviated nasal septum [J34.2])      (Hypertrophy of both inferior nasal turbinates [J34.3])    Surgeon:  Cricket Olguin Jr., MD Provider:  Stevie Ross CRNA    Anesthesia Type:  general ASA Status:  2          Anesthesia Type: general    Vitals  Vitals Value Taken Time   /61 2/6/2020 12:16 PM   Temp 97.5 °F (36.4 °C) 2/6/2020 12:15 PM   Pulse 86 2/6/2020 12:27 PM   Resp 14 2/6/2020 12:15 PM   SpO2 96 % 2/6/2020 12:27 PM   Vitals shown include unvalidated device data.        Post Anesthesia Care and Evaluation    Patient location during evaluation: PACU  Patient participation: complete - patient participated  Level of consciousness: awake and alert  Pain management: adequate  Airway patency: patent  Anesthetic complications: No anesthetic complications    Cardiovascular status: acceptable  Respiratory status: acceptable  Hydration status: acceptable    Comments: Blood pressure 109/61, pulse 86, temperature 97.5 °F (36.4 °C), temperature source Temporal, resp. rate 14, SpO2 97 %.    Pt discharged from PACU based on yue score >8

## 2020-02-06 NOTE — OP NOTE
Cricket Olguin Jr, MD     OTOLARYNGOLOGY, HEAD AND NECK SURGERY OPERATIVE NOTE   Chente Campbell  2/6/2020    Pre-op Diagnosis:   Chronic sinusitis, unspecified location [J32.9]  Rhinitis, chronic [J31.0]  Acquired deviated nasal septum [J34.2]  Hypertrophy of both inferior nasal turbinates [J34.3]    Post-op Diagnosis:     Post-Op Diagnosis Codes:     * Chronic sinusitis, unspecified location [J32.9]     * Rhinitis, chronic [J31.0]     * Acquired deviated nasal septum [J34.2]     * Hypertrophy of both inferior nasal turbinates [J34.3]    Procedure/CPT® Codes:      Procedure(s):  ENDOSCOPIC FUNCTIONAL SINUS SURGERY WITH SEPTOPLASTY AND RESECTION INFERIOR TURBINATES  RESECTION OF NASAL TURBINATES- Submucosal    Surgeon(s):  Cricket Olguin Jr., MD    Anesthesia:   General    Staff:   Circulator: Daniela Mascorro RN; Jenna Jose RN; Kike Bartlett RN  Scrub Person: Bailey Caceres; Richa Torres    Estimated Blood Loss:   150 cc    Specimens:   L sinus contents      Drains:   none    Findings:  Septum- Deviated R to L low moderate horizontally  Inferior turbinates- Bilateral hypertrophy wit nasal obstruction  Ethmoid- L side with mucosal hypertrophy in anterior and posterior cells, hyperemic  Middle meatus- pus expressed from the the L OMC  Maxillary sinus- L side with pus in antrum, thickened mucosa    Complications:   L side sphenopalatine bleeding    Assistants:  none    Implants:  Propel stent L ethmoid    Time Out::  A time out was performed to confirm the patient, procedure and laterality.    Reason for the Operation: Chente Campbell is a 59 y.o. male who has had a history of L sided sinusitis and headaches, with Deviated nasal septum and turbinate hypertrophy.  Preoperative discussion was carried out. Risks, benefits, options for therapy and complications were explained in clear and simple language.    Procedure Description:  The patient was taken back to the operating room,  positioned on the operating table and placed under satisfactory anesthesia by the anesthesia staff.    A timeout was carried out to confirm patient identification and surgical site.    Procedure detail:  Sinus surgery:  Approach: Endoscopic 0 degree 30 degreee  Instrumentation: Shaver    Nasal packing:  The nose was injected with Lidocaine 1% and Epi 1:100k: 11.5 cc  The nose was packed with cocaine pledgets and allowed to stand.  The nasal packs were removed prior to the procedure.    Turbinoplasty:  Turbinoplasty was carried out endoscopically.  The scope was used to evaluate the turbinates.  The shaver was used to remove soft tissue of the turbinates.  Bone was removed.  A medial flap of mucosa was developed and then folded under the resected bone edge. Coblation was used to coapt the edges of the flaps  Closure:  After completion of the remaining nasal surgery, Gelfoam was placed under the turbinates to hold the flap closure in place.    Ethmoidectomy: Left side  The uncinate process was injected.  The uncinate was incised and taken down.  The anterior ethmoid was taken down, identifying the landmarks of orbital wall, roof of ethmoid sinus and maxillary ostium.  The ground lamella was entered and the posterior cells entered.  After removal of tissue, the L ethmoid sinus continued to ooze. Final hemostasis was obtained with Gelfoam packing.  Tissue removal:  Anterior and posterior ethmoid mucosa and bone    Maxillary Antrostomy: Left  Maxillary antrostomy was carried out by using the spoon curette to identify the posterior fontanel.  The maxillary antrum was opened posterior to anterior and the natural ostium was enlarged.  The edges of the antrostomy were shaved smooth.  Tissue removal: Medial maxillary sinus and OMC tissue    Septoplasty:  A right Pete's incision was created and sharp elevation was carried down to the perichondrium on both sides of the septum. An incision was created in the perichondrium and  a subperichondrial elevation was started with a [place] elevator on both sides of the septum. Subperichondrial flaps were elevated back to the bony septum with a caudal elevator. Once this was done, a 15 blade was used to create an incision in the caudal cartilage, leaving a 1-1.5 cm caudal strut. A swivel knife was then used to cut a wedge of cartilage, leaving at least a 1 cm dorsal strut. The cartilage was removed with a Tachahashi. The, using a freer- elevator, the remaining cartilage at the maxillary crest was lifted up and removed. The maxillary crest was deviated to the left side. This was removed with an osteatome and a Tachahashi forceps. The bony septum was noted to be deviated to the left side. This was removed with a Tachahashi forceps. After this was done, the septum was inspected and appeared to be in the midline.  The preserved cartilage was then morcelized and replaced in the midline cartilaginous defect to reconstruct the septum  Nasal Osteotomy: Maxillary Crest    Closure:  The Pete’s incision was closed with 5-0 gut.  A transfixion suture was used to center the caudal septum.  The septal flaps were closed with a 4-0 gut whipping stitch.  Elena was placed in the Left sinuses.  Gelfoam was placed on the Elena.  A Propel stent was placed in the ethmoid sinus and middle meatus.  Merocel packing was placed low in the nasal passage Bilateral.  The gelfoam was used to pack around the inferior turbinates, Bilateral.  A mustache dressing was placed on the patient.    The operative site was inspected for retained foreign bodies and instruments.   Sponge/needle count was correct.  Hemostasis was satisfactory  The patient was then turned over to the anesthesia team and allowed to wake from anesthesia.     Disposition: The patient was transported to the PACU in Good condition.     Postoperative discussion held with: Spouse, Daughter  Procedure and findings reviewed.  Assess for DVT CMN: Patient is in the  immediate post-operative period and is not a candidate for anticoagulation therapy  Patient is at increased risk for bleeding if anticoagulant therapy is used    Cricket Olguin Jr, MD  2/6/2020  12:10 PM

## 2020-02-06 NOTE — ANESTHESIA PROCEDURE NOTES
Airway  Date/Time: 2/6/2020 9:12 AM  Airway not difficult    General Information and Staff    Patient location during procedure: OR  CRNA: Stevie Ross CRNA    Indications and Patient Condition  Indications for airway management: airway protection    Preoxygenated: yes  Mask difficulty assessment: 0 - not attempted    Final Airway Details  Final airway type: endotracheal airway      Successful airway: ETT  Cuffed: yes   Successful intubation technique: direct laryngoscopy  Facilitating devices/methods: intubating stylet  Endotracheal tube insertion site: oral  Blade: Derian  Blade size: 3  ETT size (mm): 7.0  Cormack-Lehane Classification: grade IIa - partial view of glottis  Placement verified by: chest auscultation and capnometry   Cuff volume (mL): 6  Measured from: lips  ETT/EBT  to lips (cm): 22  Number of attempts at approach: 1  Assessment: lips, teeth, and gum same as pre-op and atraumatic intubation    Additional Comments  ATRAUMATIC INTUBATION

## 2020-02-06 NOTE — ANESTHESIA PREPROCEDURE EVALUATION
Anesthesia Evaluation     Patient summary reviewed   no history of anesthetic complications:  NPO Solid Status: > 8 hours             Airway   Mallampati: II  TM distance: >3 FB  Neck ROM: full  Dental      Pulmonary    (-) COPD, asthma, sleep apnea, not a smoker  Cardiovascular   Exercise tolerance: excellent (>7 METS)    (+) hypertension, hyperlipidemia,   (-) pacemaker, past MI, angina, cardiac stents      Neuro/Psych  (-) seizures, TIA, CVA  GI/Hepatic/Renal/Endo    (+) obesity,  GERD,  diabetes mellitus,   (-) liver disease, no renal disease    Musculoskeletal     Abdominal    Substance History      OB/GYN          Other                        Anesthesia Plan    ASA 2     general     intravenous induction     Anesthetic plan, all risks, benefits, and alternatives have been provided, discussed and informed consent has been obtained with: patient.

## 2020-02-06 NOTE — DISCHARGE INSTRUCTIONS
WRITTEN INSTRUCTIONS TO PATIENT/FAMILY:  Patient instruction sheet  Sinus Surgery     YOUR NEXT PAIN MEDICATION IS DUE AT______________         General Anesthesia, Adult, Care After  Refer to this sheet in the next few weeks. These instructions provide you with information on caring for yourself after your procedure. Your health care provider may also give you more specific instructions. Your treatment has been planned according to current medical practices, but problems sometimes occur. Call your health care provider if you have any problems or questions after your procedure.  WHAT TO EXPECT AFTER THE PROCEDURE  After the procedure, it is typical to experience:  · Sleepiness.  · Nausea and vomiting.  HOME CARE INSTRUCTIONS  · For the first 24 hours after general anesthesia:  ¨ Have a responsible person with you.  ¨ Do not drive a car. If you are alone, do not take public transportation.  ¨ Do not drink alcohol.  ¨ Do not take medicine that has not been prescribed by your health care provider.  ¨ Do not sign important papers or make important decisions.  ¨ You may resume a normal diet and activities as directed by your health care provider.  · Change bandages (dressings) as directed.  · If you have questions or problems that seem related to general anesthesia, call the hospital and ask for the anesthetist or anesthesiologist on call.  SEEK MEDICAL CARE IF:  · You have nausea and vomiting that continue the day after anesthesia.  · You develop a rash.  SEEK IMMEDIATE MEDICAL CARE IF:    · You have difficulty breathing.  · You have chest pain.  · You have any allergic problems.     This information is not intended to replace advice given to you by your health care provider. Make sure you discuss any questions you have with your health care provider.     Document Released: 03/26/2002 Document Revised: 01/08/2016 Document Reviewed: 07/03/2014  [employer] Interactive Patient Education ©2016 [employer]    CALL YOUR  PHYSICIAN IF YOU EXPERIENCE  INCREASED PAIN NOT HELPED BY YOUR PAIN MEDICATION.    .                                              Fall Prevention in the Home      Falls can cause injuries. They can happen to people of all ages. There are many things you can do to make your home safe and to help prevent falls.    WHAT CAN I DO ON THE OUTSIDE OF MY HOME?  · Regularly fix the edges of walkways and driveways and fix any cracks.  · Remove anything that might make you trip as you walk through a door, such as a raised step or threshold.  · Trim any bushes or trees on the path to your home.  · Use bright outdoor lighting.  · Clear any walking paths of anything that might make someone trip, such as rocks or tools.  · Regularly check to see if handrails are loose or broken. Make sure that both sides of any steps have handrails.  · Any raised decks and porches should have guardrails on the edges.  · Have any leaves, snow, or ice cleared regularly.  · Use sand or salt on walking paths during winter.  · Clean up any spills in your garage right away. This includes oil or grease spills.  WHAT CAN I DO IN THE BATHROOM?    · Use night lights.  · Install grab bars by the toilet and in the tub and shower. Do not use towel bars as grab bars.  · Use non-skid mats or decals in the tub or shower.  · If you need to sit down in the shower, use a plastic, non-slip stool.  · Keep the floor dry. Clean up any water that spills on the floor as soon as it happens.  · Remove soap buildup in the tub or shower regularly.  · Attach bath mats securely with double-sided non-slip rug tape.  · Do not have throw rugs and other things on the floor that can make you trip.  WHAT CAN I DO IN THE BEDROOM?  · Use night lights.  · Make sure that you have a light by your bed that is easy to reach.  · Do not use any sheets or blankets that are too big for your bed. They should not hang down onto the floor.  · Have a firm chair that has side arms. You can use this  for support while you get dressed.  · Do not have throw rugs and other things on the floor that can make you trip.  WHAT CAN I DO IN THE KITCHEN?  · Clean up any spills right away.  · Avoid walking on wet floors.  · Keep items that you use a lot in easy-to-reach places.  · If you need to reach something above you, use a strong step stool that has a grab bar.  · Keep electrical cords out of the way.  · Do not use floor polish or wax that makes floors slippery. If you must use wax, use non-skid floor wax.  · Do not have throw rugs and other things on the floor that can make you trip.  WHAT CAN I DO WITH MY STAIRS?  · Do not leave any items on the stairs.  · Make sure that there are handrails on both sides of the stairs and use them. Fix handrails that are broken or loose. Make sure that handrails are as long as the stairways.  · Check any carpeting to make sure that it is firmly attached to the stairs. Fix any carpet that is loose or worn.  · Avoid having throw rugs at the top or bottom of the stairs. If you do have throw rugs, attach them to the floor with carpet tape.  · Make sure that you have a light switch at the top of the stairs and the bottom of the stairs. If you do not have them, ask someone to add them for you.  WHAT ELSE CAN I DO TO HELP PREVENT FALLS?  · Wear shoes that:  ¨ Do not have high heels.  ¨ Have rubber bottoms.  ¨ Are comfortable and fit you well.  ¨ Are closed at the toe. Do not wear sandals.  · If you use a stepladder:  ¨ Make sure that it is fully opened. Do not climb a closed stepladder.  ¨ Make sure that both sides of the stepladder are locked into place.  ¨ Ask someone to hold it for you, if possible.  · Clearly jeanette and make sure that you can see:  ¨ Any grab bars or handrails.  ¨ First and last steps.  ¨ Where the edge of each step is.  · Use tools that help you move around (mobility aids) if they are needed. These include:  ¨ Canes.  ¨ Walkers.  ¨ Scooters.  ¨ Crutches.  · Turn on the  lights when you go into a dark area. Replace any light bulbs as soon as they burn out.  · Set up your furniture so you have a clear path. Avoid moving your furniture around.  · If any of your floors are uneven, fix them.  · If there are any pets around you, be aware of where they are.  · Review your medicines with your doctor. Some medicines can make you feel dizzy. This can increase your chance of falling.  Ask your doctor what other things that you can do to help prevent falls.     This information is not intended to replace advice given to you by your health care provider. Make sure you discuss any questions you have with your health care provider.     Document Released: 10/14/2010 Document Revised: 05/03/2016 Document Reviewed: 01/22/2016  [employer] Interactive Patient Education ©2016 [employer]     PATIENT/FAMILY/RESPONSIBLE PARTY VERBALIZES UNDERSTANDING OF ABOVE EDUCATION.  COPY OF PAIN SCALE GIVEN AND REVIEWED WITH VERBALIZED UNDERSTANDING.

## 2020-02-07 LAB
CYTO UR: NORMAL
LAB AP CASE REPORT: NORMAL
PATH REPORT.FINAL DX SPEC: NORMAL
PATH REPORT.GROSS SPEC: NORMAL

## 2020-02-11 ENCOUNTER — TELEPHONE (OUTPATIENT)
Dept: OTOLARYNGOLOGY | Facility: CLINIC | Age: 60
End: 2020-02-11

## 2020-02-11 NOTE — TELEPHONE ENCOUNTER
PT wife calling to report that PT is having pain that radiates across temples and comes and goes constantly.

## 2020-02-17 ENCOUNTER — OFFICE VISIT (OUTPATIENT)
Dept: OTOLARYNGOLOGY | Facility: CLINIC | Age: 60
End: 2020-02-17

## 2020-02-17 VITALS — DIASTOLIC BLOOD PRESSURE: 69 MMHG | TEMPERATURE: 96.7 F | SYSTOLIC BLOOD PRESSURE: 115 MMHG

## 2020-02-17 DIAGNOSIS — J32.9 CHRONIC SINUSITIS, UNSPECIFIED LOCATION: Primary | ICD-10-CM

## 2020-02-17 DIAGNOSIS — J31.0 RHINITIS, CHRONIC: ICD-10-CM

## 2020-02-17 DIAGNOSIS — R55 VASOVAGAL EPISODE: ICD-10-CM

## 2020-02-17 DIAGNOSIS — J34.3 HYPERTROPHY OF BOTH INFERIOR NASAL TURBINATES: ICD-10-CM

## 2020-02-17 DIAGNOSIS — Z98.890 S/P SINUS SURGERY: ICD-10-CM

## 2020-02-17 DIAGNOSIS — J34.2 ACQUIRED DEVIATED NASAL SEPTUM: ICD-10-CM

## 2020-02-17 PROCEDURE — 31237 NSL/SINS NDSC SURG BX POLYPC: CPT | Performed by: OTOLARYNGOLOGY

## 2020-02-17 PROCEDURE — 99024 POSTOP FOLLOW-UP VISIT: CPT | Performed by: OTOLARYNGOLOGY

## 2020-02-17 NOTE — PATIENT INSTRUCTIONS
Afrin use:  Use 2 puffs each nostril 3 times daily for 3 days only!!  Stop using after 3 days unless instructed to use longer    Nasal steroid use:  Using nasal steroids:  You will be prescribed one of the following nasal steroids: Flonase, Nasacort, Nasonex, Rhinocort, Qnasl, Zetonna  2 puffs each nostril 2 times daily  Start as soon as possible    Use Afrin first and wait 10 minutes to allow the nose to open. Then administer nasal steroids.    NASAL SALINE:  Use 2 puffs each nostril 4-6 times daily and more frequently if possible.  You can buy saline spray or you can make your own and use an old spray bottle to administer  Use a humidifier at bedside  Recipe for saline:d  Water                                 1 quart  Salt (table)                        1 tablespoon  Gylcerin (or Poly Syrup)    1 teaspoon  Sodium bicarbonate           1 teaspoon  Sprays or Firth pots are recommended    NOSE BLOWING:  Do blow nose now     [email or web address]

## 2020-02-17 NOTE — PROGRESS NOTES
Nelida Ricks LPN   Patient Intake Note    Review of Systems  Review of Systems   Constitutional: Negative for diaphoresis and fever.   HENT:        See hPI   Respiratory: Negative for cough, choking and shortness of breath.    Gastrointestinal: Negative for diarrhea, nausea and vomiting.   Neurological: Positive for headaches. Negative for dizziness and light-headedness.   Psychiatric/Behavioral: Negative for sleep disturbance.       Tobacco Use: Screening and Cessation Intervention  Social History    Tobacco Use      Smoking status: Former Smoker        Types: Cigarettes        Quit date: 1998        Years since quittin.1      Smokeless tobacco: Never Used        Nelida Ricks LPN  2020  11:26 AM

## 2020-02-17 NOTE — PROGRESS NOTES
Cricket Olguin Jr, MD     ENT FOLLOW UP NOTE     Chief Complaint   Patient presents with   • Post-op     Sinus Surgery        HISTORY OF PRESENT ILLNESS:  Accompanied by:  Wife  Chente Campbell is a  59 y.o. male who is here for follow up. S/p L FESS, Septoplasty and bilateral ITplasty  He using pain pills around the clock.  He has frontal headache.  He is taking pain pills around the clock for the pain in head.    Review of Systems  Reviewed per patient intake note and confirmed by me    Past History:  Past medical and surgical history, family history and social history reviewed and updated when appropriate.  Current medications and allergies reviewed and updated when appropriate.  Allergies:  Sulfa antibiotics and Penicillins        Vital Signs:   Temp:  [96.7 °F (35.9 °C)] 96.7 °F (35.9 °C)  BP: (115)/(69) 115/69  EXAMINATION:  CONSTITUTIONAL:    well nourished, well-developed, alert, oriented, in no acute distress      BODY HABITUS:    Normal body habitus     COMMUNICATION:    able to communicate normally, normal voice quality     HEAD:     Normocephalic, without obvious abnormality, atraumatic     FACE:    structure normal, no tenderness present, no lesions/masses, no evidence of trauma     SALIVARY GLANDS:    parotid glands with no tenderness, no swelling, no masses, submandibular glands with normal size, nontender      EYE:    ocular motility normal, eyelids normal, orbits normal, no proptosis, conjunctiva clear, sclera non-icteric, pupils equal, round, reactive to light and accomodation     HEARING:    response to conversational voice normal     EARS:    Otoscopic exam   Microscopic exam- See procedure note     NOSE EXTERNAL:    APPEARANCE: normal, straight, with good projection, no tenderness, no lesions, no tenderness, good nasal support, patent nares     NOSE INTERNAL: Nasal endoscopy   See procedure note     ORAL CAVITY:    Normal lips with no lesions, dentition normal for age, FOM intact without  lesions and normal salivary flow, Mucosa intact without lesions, Hard and soft palate normal without lesions     OROPHARYNX:    Direct examination  oropharyngeal mucosa normal, tonsil(s) with normal appearance       NECK:    short, thick  moderate   Neck position: lordotic     LYMPH NODES :    no adenopathy     THYROID:    no overt thyromegaly, no tenderness, nodules or mass present on palpation, position midline     CHEST/RESPIRATORY:    respiratory effort normal, no rales, rubs or wheezing, no stridor, normal appearance to chest     CARDIOVASCULAR:    regular rate and rhythm, no murmurs, gallups, no peripheral edema     NEURO/PSYCHIATRIC :    oriented appropriately for age, mood normal, affect appropriate, cranial nerves intact grossly (unless specifically described), gait normal for age    RESULTS REVIEW:    Lab Results   Component Value Date    FINALDX  02/06/2020     Sinus contents, right and left, excision:  Benign sinus contents with mild chronic inflammation  Benign turbinate with viable bone  Negative for fungus ball               ASSESSMENT:   Diagnosis Plan   1. Chronic sinusitis, unspecified location      L Ethmoid  L maxillary  s/p FESS left side   2. Rhinitis, chronic      Improved after surgery   3. Acquired deviated nasal septum      resolved ater surgery   4. Hypertrophy of both inferior nasal turbinates      Improved after surgery  Bilateral infer turbinoplasty   5. S/P sinus surgery     6. Vasovagal episode      following post-FESS debridement  Resolved           PLAN:  Resume preoperative activity and medications.  Patient has residual headaches. I will continue post- FESS care  Blow nose now  Afrin x 3 days  Nasal saline  Nasacort BID  MY CHART:  Patient is Patient is using My Chart          Patient understand(s) and agree(s) with the treatment plan as described.    Return in about 2 weeks (around 3/2/2020) for Recheck nose, possible debridement.     Cricket Olguin Jr, MD  02/17/20  11:56 AM

## 2020-02-17 NOTE — PROGRESS NOTES
ENT PROCEDURE NOTE:  Anesthesia: topical 4% tetracaine and oxymetazoline mix    Endoscopy Type:  Nasal Endoscopic Debridement    Procedure Details:    The patient was placed in the sitting position. After topical anesthesia and decongestion,  a rigid nasal endoscope  0 degree was passed along the nasal floor to the nasopharynx.  The scope was then passed to the middle and superior aspects of the nasal cavity. Systematic examination of the nasal cavity, nasopharynx and structures was performed.    Debridement was carried with endoscopic guidance using suction and nasal forceps    Findings:    NOSE EXAMINATION:    Nasal endoscopy   NASALMUCOSA:    abnormal:        Bilateral- Healing mucosa of nose    NASAL PASSAGES:     abnormal   Bilateral- more open than preop, healing well    NASAL VALVE:     intact, good support and no nasal obstruction   SEPTUM:     mucosa abnormal   Bilateral- healing, crusted     midline   INFERIOR TURBINATES:     abnormal  Bilateral- healing, some crusting     resected:  Bilateral   MIDDLE TURBINATES:     abnormal:        RIGHT: mildly red  LEFT: post surgical intact   MIDDLE MEATUS:       findings       RIGHT: non- surgical  LEFT: healing, Propel in place   SPHENO-ETHMOID RECESS:    normal, normal sphenoid ostia. no mucopus, non-surgical   NASOPHARYNX:     normal mucosa, normal secretions, Eustachian tubes normal, normal palate mobility, no lesions, adenoids appear normal  SINUS EXAMINATION:    FRONTAL:      non-surgical  RIGHT    post-surgical  LEFT: crusted    Secretions:   LEFT: crusted  ETHMOID:     non-surgical  RIGHT    post-surgical  LEFT: crusted  MIDDLE MEATUS:   RIGHT: non surgical  LEFT: healing  MAXILLARY:     non-surgical  RIGHT    post-surgical  LEFT: crusted    Antrostomy:    LEFT: healing, open  SPHENOID:     normal sphenoethmoid recess, ostium open, no mucopus    Condition:  Stable.  Patient tolerated procedure well.    Complications:  Vasovgal response    Post-procedure  instructions reviewed with Patient, Spouse  Instructions documented in AVS for patient to review

## 2020-03-04 ENCOUNTER — OFFICE VISIT (OUTPATIENT)
Dept: OTOLARYNGOLOGY | Facility: CLINIC | Age: 60
End: 2020-03-04

## 2020-03-04 VITALS
TEMPERATURE: 97 F | BODY MASS INDEX: 29.47 KG/M2 | SYSTOLIC BLOOD PRESSURE: 143 MMHG | HEART RATE: 113 BPM | DIASTOLIC BLOOD PRESSURE: 75 MMHG | WEIGHT: 217.6 LBS

## 2020-03-04 DIAGNOSIS — J32.4 CHRONIC PANSINUSITIS: ICD-10-CM

## 2020-03-04 DIAGNOSIS — J31.0 RHINITIS, CHRONIC: ICD-10-CM

## 2020-03-04 DIAGNOSIS — Z98.890 S/P NASAL SEPTOPLASTY: ICD-10-CM

## 2020-03-04 DIAGNOSIS — J34.3 HYPERTROPHY OF BOTH INFERIOR NASAL TURBINATES: ICD-10-CM

## 2020-03-04 DIAGNOSIS — J32.9 CHRONIC SINUSITIS, UNSPECIFIED LOCATION: Primary | ICD-10-CM

## 2020-03-04 DIAGNOSIS — J34.2 ACQUIRED DEVIATED NASAL SEPTUM: ICD-10-CM

## 2020-03-04 DIAGNOSIS — Z98.890 S/P SINUS SURGERY: ICD-10-CM

## 2020-03-04 PROCEDURE — 99024 POSTOP FOLLOW-UP VISIT: CPT | Performed by: OTOLARYNGOLOGY

## 2020-03-04 PROCEDURE — 31237 NSL/SINS NDSC SURG BX POLYPC: CPT | Performed by: OTOLARYNGOLOGY

## 2020-03-04 NOTE — PATIENT INSTRUCTIONS
NASAL SALINE:  Use 2 puffs each nostril 4-6 times daily and more frequently if possible.  You can buy saline spray or you can make your own and use an old spray bottle to administer  Use a humidifier at bedside  Recipe for saline:d  Water                                 1 quart  Salt (table)                        1 tablespoon  Gylcerin (or Poly Syrup)    1 teaspoon  Sodium bicarbonate           1 teaspoon  Sprays or Tutor Key pots are recommended    Nasal steroid use:  Using nasal steroids:  You will be prescribed one of the following nasal steroids: Flonase, Nasacort, Nasonex, Rhinocort, Qnasl, Zetonna  2 puffs each nostril 2 times daily  Start as soon as possible  If you are using Afrin for 3 days with the nasal steroid,  Use Afrin first and wait 10 minutes to allow the nose to open. Then administer nasal steroids.    EAR CARE: :using oil  Use a hair dryer on low heat and blow into the ear canals 2 times daily to keep ears dry.  DO Not use Q tips or Dinora pins, ever!!  Do not use alcohol in the ear canal (this will cause dryness and itching)  NO peroxide or alcohol in ears  Oil: Use Sweet oil, Olive oil, or Mineral oil, purchased over the counter, once a week, Do not use Q tips, You may use a hair dryer on low heat. Blow in ears for 10-15 seconds 2 times daily to dry ear canals or if ear canals are itching.           [email or web address]

## 2020-03-04 NOTE — PROGRESS NOTES
Cricket Olguin Jr, MD     ENT FOLLOW UP NOTE     Chief Complaint   Patient presents with   • Sinus Problem     Post Op        HISTORY OF PRESENT ILLNESS:  Accompanied by:  No one  Chente Campbell is a  59 y.o. male who is here for follow up. He is feeling better.  He still has lot of throat drainage.  He has thin yellow drainage he is blowing.  He has dark green particles in blowing.    Review of Systems  Reviewed per patient intake note and confirmed by me    Past History:  Past medical and surgical history, family history and social history reviewed and updated when appropriate.  Current medications and allergies reviewed and updated when appropriate.  Allergies:  Sulfa antibiotics and Penicillins        Vital Signs:      EXAMINATION:  CONSTITUTIONAL:    well nourished, well-developed, alert, oriented, in no acute distress      BODY HABITUS:    Normal body habitus     COMMUNICATION:    able to communicate normally, normal voice quality     HEAD:     Normocephalic, without obvious abnormality, atraumatic     FACE:    structure normal, no tenderness present, no lesions/masses, no evidence of trauma     SALIVARY GLANDS:    parotid glands with no tenderness, no swelling, no masses, submandibular glands with normal size, nontender      EYE:    ocular motility normal, eyelids normal, orbits normal, no proptosis, conjunctiva clear, sclera non-icteric, pupils equal, round, reactive to light and accomodation     HEARING:    response to conversational voice normal     EARS:    Otoscopic exam   Microscopic exam- See procedure note     NOSE EXTERNAL:    APPEARANCE: normal, straight, with good projection, no tenderness, no lesions, no tenderness, good nasal support, patent nares     NOSE INTERNAL: Nasal endoscopy   See procedure note     ORAL CAVITY:    Normal lips with no lesions, dentition normal for age, FOM intact without lesions and normal salivary flow, Mucosa intact without lesions, Hard and soft palate normal without  lesions     OROPHARYNX:    Direct examination  oropharyngeal mucosa normal, tonsil(s) with normal appearance       NECK:    short, thick  moderate   Neck position: lordotic     LYMPH NODES :    no adenopathy     THYROID:    no overt thyromegaly, no tenderness, nodules or mass present on palpation, position midline     CHEST/RESPIRATORY:    respiratory effort normal, no rales, rubs or wheezing, no stridor, normal appearance to chest     CARDIOVASCULAR:    regular rate and rhythm, no murmurs, gallups, no peripheral edema     NEURO/PSYCHIATRIC :    oriented appropriately for age, mood normal, affect appropriate, cranial nerves intact grossly (unless specifically described), gait normal for age    RESULTS REVIEW:    No reports available for review          ASSESSMENT:   Diagnosis Plan   1. Chronic sinusitis, unspecified location      s/p FESS  healing well   2. Rhinitis, chronic     3. Acquired deviated nasal septum      healing well   4. Hypertrophy of both inferior nasal turbinates      s/p turbinoplasty  healing well   5. S/P sinus surgery     6. Chronic pansinusitis     7. S/P nasal septoplasty             PLAN:  Resume preoperative activity and medications.  Patient is healing well. He still has crusting, removed today.  He will continue nasal steroids and nasal saline.  Flonase BID  Nasal saline  Ear care with oil   MY CHART:  Patient is Patient is using My Chart          Patient understand(s) and agree(s) with the treatment plan as described.    Return in about 3 months (around 6/4/2020) for Recheck nose, nasal endoscopy.     Cricket Olguin Jr, MD  03/04/20  3:03 PM

## 2020-03-04 NOTE — PROGRESS NOTES
Nelida Ricks LPN   Patient Intake Note    Review of Systems  Review of Systems   Constitutional: Negative for chills, fatigue and fever.   HENT:        See hPI   Respiratory: Negative for cough, choking and shortness of breath.    Gastrointestinal: Negative for diarrhea, nausea and vomiting.   Neurological: Positive for headaches. Negative for dizziness and light-headedness.   Psychiatric/Behavioral: Negative for sleep disturbance.       Tobacco Use: Screening and Cessation Intervention  Social History    Tobacco Use      Smoking status: Former Smoker        Types: Cigarettes        Quit date: 1998        Years since quittin.1      Smokeless tobacco: Never Used        Nelida Ricks LPN  3/4/2020  2:35 PM

## 2020-03-04 NOTE — PROGRESS NOTES
ENT PROCEDURE NOTE:  Anesthesia: topical 4% tetracaine and oxymetazoline mix     Endoscopy Type:  Nasal Endoscopic Debridement     Procedure Details:    The patient was placed in the sitting position. After topical anesthesia and decongestion,  a rigid nasal endoscope  0 degree was passed along the nasal floor to the nasopharynx.  The scope was then passed to the middle and superior aspects of the nasal cavity. Systematic examination of the nasal cavity, nasopharynx and structures was performed.     Debridement was carried with endoscopic guidance using suction and nasal forceps     Findings:    NOSE EXAMINATION:    Nasal endoscopy   NASALMUCOSA:    abnormal:        Bilateral- Healing mucosa of nose    NASAL PASSAGES:     abnormal   Bilateral- more open than preop, healing well    NASAL VALVE:     intact, good support and no nasal obstruction   SEPTUM:     mucosa abnormal   Bilateral- healing, crusted     midline   INFERIOR TURBINATES:     abnormal  Bilateral- healing, some crusting     resected:  Bilateral   MIDDLE TURBINATES:     abnormal:        RIGHT: mildly red  LEFT: post surgical intact   MIDDLE MEATUS:       findings       RIGHT: non- surgical  LEFT: healing, Propel in place   SPHENO-ETHMOID RECESS:    normal, normal sphenoid ostia. no mucopus, non-surgical   NASOPHARYNX:     normal mucosa, normal secretions, Eustachian tubes normal, normal palate mobility, no lesions, adenoids appear normal  SINUS EXAMINATION:    FRONTAL:    RIGHT    non-surgical  LEFT: crusted, cleaned    Secretions:  LEFT: crusted  ETHMOID:     non-surgical  RIGHT    post-surgical  LEFT: crusted  MIDDLE MEATUS:   RIGHT: non surgical  LEFT: healing  MAXILLARY:     non-surgical  RIGHT    post-surgical  LEFT: mildl crusting, healing well, cleaned    Antrostomy:    LEFT: healing, open  SPHENOID:     normal sphenoethmoid recess, ostium open, no mucopus     Condition:  Stable.  Patient tolerated procedure  well.     Complications:  None     Post-procedure instructions reviewed with Patient, Spouse  Instructions documented in AVS for patient to review

## 2020-07-06 RX ORDER — ATORVASTATIN CALCIUM 20 MG/1
20 TABLET, FILM COATED ORAL DAILY
Qty: 30 TABLET | Refills: 5 | Status: SHIPPED | OUTPATIENT
Start: 2020-07-06 | End: 2020-07-13 | Stop reason: SDUPTHER

## 2020-07-06 NOTE — TELEPHONE ENCOUNTER
Pt called to schedule his Annual Physical. He wants to know if he could also get Dr. French to call him to see if she would call in his Metformin and cholesterol meds before his appt because he will be completely out in 2-3 days.

## 2020-07-13 ENCOUNTER — OFFICE VISIT (OUTPATIENT)
Dept: INTERNAL MEDICINE | Facility: CLINIC | Age: 60
End: 2020-07-13

## 2020-07-13 VITALS
HEART RATE: 90 BPM | WEIGHT: 212 LBS | TEMPERATURE: 97.8 F | DIASTOLIC BLOOD PRESSURE: 81 MMHG | SYSTOLIC BLOOD PRESSURE: 135 MMHG | OXYGEN SATURATION: 98 % | BODY MASS INDEX: 28.71 KG/M2

## 2020-07-13 DIAGNOSIS — Z12.5 SCREENING PSA (PROSTATE SPECIFIC ANTIGEN): ICD-10-CM

## 2020-07-13 DIAGNOSIS — K21.9 GASTROESOPHAGEAL REFLUX DISEASE WITHOUT ESOPHAGITIS: ICD-10-CM

## 2020-07-13 DIAGNOSIS — J34.89 NASAL DRAINAGE: ICD-10-CM

## 2020-07-13 DIAGNOSIS — E78.2 ELEVATED TRIGLYCERIDES WITH HIGH CHOLESTEROL: ICD-10-CM

## 2020-07-13 DIAGNOSIS — I10 ESSENTIAL HYPERTENSION: ICD-10-CM

## 2020-07-13 DIAGNOSIS — E11.9 TYPE 2 DIABETES MELLITUS WITHOUT COMPLICATION, WITHOUT LONG-TERM CURRENT USE OF INSULIN (HCC): Primary | ICD-10-CM

## 2020-07-13 PROCEDURE — 99214 OFFICE O/P EST MOD 30 MIN: CPT | Performed by: INTERNAL MEDICINE

## 2020-07-13 RX ORDER — MULTIVIT WITH MINERALS/LUTEIN
250 TABLET ORAL DAILY
COMMUNITY
End: 2021-08-03

## 2020-07-13 RX ORDER — ASPIRIN 81 MG/1
81 TABLET, CHEWABLE ORAL DAILY
COMMUNITY
End: 2020-07-13 | Stop reason: SDUPTHER

## 2020-07-13 RX ORDER — ASPIRIN 81 MG/1
81 TABLET, CHEWABLE ORAL DAILY
Qty: 90 TABLET | Refills: 3 | Status: SHIPPED | OUTPATIENT
Start: 2020-07-13 | End: 2022-05-03

## 2020-07-13 RX ORDER — FLUTICASONE PROPIONATE 50 MCG
2 SPRAY, SUSPENSION (ML) NASAL 2 TIMES DAILY
Qty: 48 G | Refills: 11 | Status: SHIPPED | OUTPATIENT
Start: 2020-07-13

## 2020-07-13 RX ORDER — ATORVASTATIN CALCIUM 20 MG/1
20 TABLET, FILM COATED ORAL DAILY
Qty: 90 TABLET | Refills: 3 | Status: SHIPPED | OUTPATIENT
Start: 2020-07-13 | End: 2020-11-24 | Stop reason: SDUPTHER

## 2020-07-13 RX ORDER — VIT C/B6/B5/MAGNESIUM/HERB 173 50-5-6-5MG
CAPSULE ORAL DAILY
COMMUNITY

## 2020-07-13 RX ORDER — OLMESARTAN MEDOXOMIL 20 MG/1
20 TABLET ORAL DAILY
Qty: 90 TABLET | Refills: 3 | Status: SHIPPED | OUTPATIENT
Start: 2020-07-13 | End: 2021-08-02

## 2020-07-13 RX ORDER — OMEPRAZOLE 20 MG/1
20 CAPSULE, DELAYED RELEASE ORAL DAILY
Qty: 90 CAPSULE | Refills: 3 | Status: SHIPPED | OUTPATIENT
Start: 2020-07-13 | End: 2021-06-24 | Stop reason: SDUPTHER

## 2020-07-13 NOTE — PROGRESS NOTES
Subjective     Chief Complaint   Patient presents with   • Annual Exam       History of Present Illness  The sinus issue has been a disaster. Leaning over causes a yellow drainage from the left nostril intermittently.   Continues to have sniffing and drainage. Has been back to see the ENT.    Patient doesn't check his glucose. States that his monitor quit working and he hasn't replaced it.   Closed gym and hasn't been exercising.     Patient's PMR from outside medical facility reviewed and noted.    Review of Systems   Constitutional: Negative for chills and fever.   HENT: Positive for postnasal drip. Negative for sore throat.         Nasal discharge   Eyes: Negative for discharge and redness.   Respiratory: Negative for cough and shortness of breath.    Cardiovascular: Negative for chest pain and leg swelling.   Gastrointestinal: Negative for diarrhea, nausea and vomiting.   Genitourinary: Negative for dysuria and frequency.   Musculoskeletal: Negative for arthralgias and back pain.   Skin: Positive for color change. Negative for rash.   Psychiatric/Behavioral: Negative for dysphoric mood and sleep disturbance.        Otherwise complete ROS reviewed and negative except as mentioned in the HPI.    Past Medical History:   Past Medical History:   Diagnosis Date   • Arthritis    • GERD (gastroesophageal reflux disease)    • Hyperlipidemia    • Hypertension      Past Surgical History:  Past Surgical History:   Procedure Laterality Date   • COLONOSCOPY     • ENDOSCOPIC FUNCTIONAL SINUS SURGERY (FESS) Bilateral 2/6/2020    Procedure: ENDOSCOPIC FUNCTIONAL SINUS SURGERY WITH SEPTOPLASTY AND RESECTION INFERIOR TURBINATES;  Surgeon: Cricket Olguin Jr., MD;  Location: [facility] OR;  Service: ENT;  Laterality: Bilateral;   • RESECTION OF NASAL TURBINATES Bilateral 2/6/2020    Procedure: RESECTION OF NASAL TURBINATES;  Surgeon: Cricket Olguin Jr., MD;  Location: [facility] OR;  Service: ENT;  Laterality:  Bilateral;     Social History:  reports that he quit smoking about 22 years ago. His smoking use included cigarettes. He has never used smokeless tobacco. He reports that he drinks alcohol. He reports that he does not use drugs.    Family History: family history includes Cancer in his mother; Tuberculosis in his maternal grandfather.       Allergies:  Allergies   Allergen Reactions   • Sulfa Antibiotics Shortness Of Breath   • Penicillins Rash     Happened 30 yrs + ago       Medications:  Prior to Admission medications    Medication Sig Start Date End Date Taking? Authorizing Provider   aspirin 81 MG chewable tablet Chew 81 mg Daily.   Yes Hao Otoole MD   atorvastatin (LIPITOR) 20 MG tablet Take 1 tablet by mouth Daily. 7/6/20  Yes Elizabeth French, DO   fluticasone (FLONASE) 50 MCG/ACT nasal spray 2 sprays into the nostril(s) as directed by provider 2 (Two) Times a Day. 12/23/19  Yes Cricket Olguin Jr., MD   lisinopril (PRINIVIL,ZESTRIL) 10 MG tablet Take 10 mg by mouth Daily. 9/16/19  Yes Hao Otoole MD   meloxicam (MOBIC) 15 MG tablet Take 15 mg by mouth Daily. Takes every other day for back 9/16/19  Yes Hao Otoole MD   metFORMIN (GLUCOPHAGE) 500 MG tablet Take 1 tablet by mouth 2 (Two) Times a Day. 7/6/20  Yes Elizabeth French, DO   omeprazole (priLOSEC) 20 MG capsule Take 20 mg by mouth Daily. 9/16/19  Yes Hao Otoole MD   Turmeric 500 MG capsule Take  by mouth Daily.   Yes Hao Otoole MD   vitamin C (ASCORBIC ACID) 250 MG tablet Take 250 mg by mouth Daily.   Yes Hao Otoole MD       Objective     Vital Signs: /81 (BP Location: Left arm, Patient Position: Sitting, Cuff Size: Adult)   Pulse 90   Temp 97.8 °F (36.6 °C) (Skin)   Wt 96.2 kg (212 lb)   SpO2 98%   BMI 28.71 kg/m²   Physical Exam   Constitutional: He appears well-developed and well-nourished.   HENT:   Head: Normocephalic and atraumatic.   Nose: Nose normal.    Mouth/Throat: Oropharynx is clear and moist.   Eyes: Conjunctivae and EOM are normal.   Neck: Normal range of motion. Neck supple.   Cardiovascular: Normal rate, regular rhythm and normal heart sounds.   Pulmonary/Chest: Effort normal and breath sounds normal.   Abdominal: Soft. Bowel sounds are normal.   Musculoskeletal: He exhibits no edema or tenderness.   Neurological: He is alert. No cranial nerve deficit.   Skin: Skin is warm and dry.   2 lesions on the right forearm and one on left leg. Raised and warty type appearance.    Psychiatric: He has a normal mood and affect.   Vitals reviewed.      Patient's Body mass index is 28.71 kg/m². BMI is within normal parameters. No follow-up required..      Results Reviewed:  Glucose   Date Value Ref Range Status   01/30/2020 127 (H) 65 - 99 mg/dL Final     BUN   Date Value Ref Range Status   01/30/2020 17 6 - 20 mg/dL Final     Creatinine   Date Value Ref Range Status   01/30/2020 0.71 (L) 0.76 - 1.27 mg/dL Final     Sodium   Date Value Ref Range Status   01/30/2020 139 136 - 145 mmol/L Final     Potassium   Date Value Ref Range Status   01/30/2020 4.1 3.5 - 5.2 mmol/L Final     Chloride   Date Value Ref Range Status   01/30/2020 103 98 - 107 mmol/L Final     CO2   Date Value Ref Range Status   01/30/2020 23.0 22.0 - 29.0 mmol/L Final     Calcium   Date Value Ref Range Status   01/30/2020 9.2 8.6 - 10.5 mg/dL Final     WBC   Date Value Ref Range Status   01/30/2020 5.79 3.40 - 10.80 10*3/mm3 Final     Hematocrit   Date Value Ref Range Status   01/30/2020 41.5 37.5 - 51.0 % Final     Platelets   Date Value Ref Range Status   01/30/2020 168 140 - 450 10*3/mm3 Final         Assessment / Plan     Assessment/Plan:  1. Type 2 diabetes mellitus without complication, without long-term current use of insulin (CMS/[facility])  - Vitamin D 25 hydroxy  - Hemoglobin A1c  - metFORMIN (GLUCOPHAGE) 500 MG tablet; Take 1 tablet by mouth 2 (Two) Times a Day.  Dispense: 180 tablet; Refill:  3    2. Elevated triglycerides with high cholesterol  - Lipid panel  - atorvastatin (LIPITOR) 20 MG tablet; Take 1 tablet by mouth Daily.  Dispense: 90 tablet; Refill: 3    3. Essential hypertension  - CBC w AUTO Differential  - Comprehensive metabolic panel  - MicroAlbumin, Urine, Random - Urine, Clean Catch  - TSH  - olmesartan (Benicar) 20 MG tablet; Take 1 tablet by mouth Daily.  Dispense: 90 tablet; Refill: 3  - aspirin 81 MG chewable tablet; Chew 1 tablet Daily.  Dispense: 90 tablet; Refill: 3    4. Screening PSA (prostate specific antigen)  - PSA SCREENING    5. Gastroesophageal reflux disease without esophagitis  - omeprazole (priLOSEC) 20 MG capsule; Take 1 capsule by mouth Daily.  Dispense: 90 capsule; Refill: 3    6. Nasal drainage  - fluticasone (FLONASE) 50 MCG/ACT nasal spray; 2 sprays into the nostril(s) as directed by provider 2 (Two) Times a Day.  Dispense: 48 g; Refill: 11        Return in about 6 months (around 1/13/2021) for Recheck, Next scheduled follow up. unless patient needs to be seen sooner or acute issues arise.    Code Status: Full    I have discussed the patient results/orders and and plan/recommendation with them at today's visit.      Elizabeth French, DO   07/13/2020

## 2020-07-14 LAB
25(OH)D3+25(OH)D2 SERPL-MCNC: 30.1 NG/ML (ref 30–100)
ALBUMIN SERPL-MCNC: 4.3 G/DL (ref 3.8–4.9)
ALBUMIN/GLOB SERPL: 1.9 {RATIO} (ref 1.2–2.2)
ALP SERPL-CCNC: 69 IU/L (ref 39–117)
ALT SERPL-CCNC: 75 IU/L (ref 0–44)
AST SERPL-CCNC: 46 IU/L (ref 0–40)
BASOPHILS # BLD AUTO: 0.1 X10E3/UL (ref 0–0.2)
BASOPHILS NFR BLD AUTO: 1 %
BILIRUB SERPL-MCNC: 0.5 MG/DL (ref 0–1.2)
BUN SERPL-MCNC: 16 MG/DL (ref 6–24)
BUN/CREAT SERPL: 23 (ref 9–20)
CALCIUM SERPL-MCNC: 9.4 MG/DL (ref 8.7–10.2)
CHLORIDE SERPL-SCNC: 100 MMOL/L (ref 96–106)
CHOLEST SERPL-MCNC: 133 MG/DL (ref 100–199)
CO2 SERPL-SCNC: 21 MMOL/L (ref 20–29)
CREAT SERPL-MCNC: 0.71 MG/DL (ref 0.76–1.27)
EOSINOPHIL # BLD AUTO: 0.2 X10E3/UL (ref 0–0.4)
EOSINOPHIL NFR BLD AUTO: 3 %
ERYTHROCYTE [DISTWIDTH] IN BLOOD BY AUTOMATED COUNT: 13.1 % (ref 11.6–15.4)
GLOBULIN SER CALC-MCNC: 2.3 G/DL (ref 1.5–4.5)
GLUCOSE SERPL-MCNC: 132 MG/DL (ref 65–99)
HBA1C MFR BLD: 6.3 % (ref 4.8–5.6)
HCT VFR BLD AUTO: 45.7 % (ref 37.5–51)
HDLC SERPL-MCNC: 33 MG/DL
HGB BLD-MCNC: 15.5 G/DL (ref 13–17.7)
IMM GRANULOCYTES # BLD AUTO: 0 X10E3/UL (ref 0–0.1)
IMM GRANULOCYTES NFR BLD AUTO: 0 %
LDLC SERPL CALC-MCNC: 42 MG/DL (ref 0–99)
LYMPHOCYTES # BLD AUTO: 1.4 X10E3/UL (ref 0.7–3.1)
LYMPHOCYTES NFR BLD AUTO: 29 %
MCH RBC QN AUTO: 32.3 PG (ref 26.6–33)
MCHC RBC AUTO-ENTMCNC: 33.9 G/DL (ref 31.5–35.7)
MCV RBC AUTO: 95 FL (ref 79–97)
MICROALBUMIN UR-MCNC: <3 UG/ML
MONOCYTES # BLD AUTO: 0.4 X10E3/UL (ref 0.1–0.9)
MONOCYTES NFR BLD AUTO: 7 %
NEUTROPHILS # BLD AUTO: 3 X10E3/UL (ref 1.4–7)
NEUTROPHILS NFR BLD AUTO: 60 %
PLATELET # BLD AUTO: 163 X10E3/UL (ref 150–450)
POTASSIUM SERPL-SCNC: 4.5 MMOL/L (ref 3.5–5.2)
PROT SERPL-MCNC: 6.6 G/DL (ref 6–8.5)
PSA SERPL-MCNC: 1.4 NG/ML (ref 0–4)
RBC # BLD AUTO: 4.8 X10E6/UL (ref 4.14–5.8)
SODIUM SERPL-SCNC: 138 MMOL/L (ref 134–144)
TRIGL SERPL-MCNC: 291 MG/DL (ref 0–149)
TSH SERPL DL<=0.005 MIU/L-ACNC: 2.92 UIU/ML (ref 0.45–4.5)
VLDLC SERPL CALC-MCNC: 58 MG/DL (ref 5–40)
WBC # BLD AUTO: 5 X10E3/UL (ref 3.4–10.8)

## 2020-08-28 ENCOUNTER — TELEPHONE (OUTPATIENT)
Dept: INTERNAL MEDICINE | Facility: CLINIC | Age: 60
End: 2020-08-28

## 2020-08-28 DIAGNOSIS — R55 SYNCOPE, UNSPECIFIED SYNCOPE TYPE: Primary | ICD-10-CM

## 2020-08-28 NOTE — TELEPHONE ENCOUNTER
Eva Bocanegra called and said that at pt's physical therapy appt, she instructed pt to lay down and do a neck stretch. When he did pt said he needed to sit up because he felt really dizzy, and was really hot and sweaty. Eva is concerned that pt might have a potential vertebral artery compression. She also stated is BP and HR remained normal.     She just wanted to make sure that Dr. French knew about this issue.

## 2020-08-31 NOTE — TELEPHONE ENCOUNTER
Spoke with pt,  He wants to talk with Dr French before he does any testing,  He's had some history of these problems and would her to check him out.

## 2020-09-03 ENCOUNTER — OFFICE VISIT (OUTPATIENT)
Dept: INTERNAL MEDICINE | Facility: CLINIC | Age: 60
End: 2020-09-03

## 2020-09-03 VITALS
HEART RATE: 99 BPM | WEIGHT: 210 LBS | BODY MASS INDEX: 28.44 KG/M2 | TEMPERATURE: 98.2 F | SYSTOLIC BLOOD PRESSURE: 160 MMHG | DIASTOLIC BLOOD PRESSURE: 85 MMHG | OXYGEN SATURATION: 98 % | HEIGHT: 72 IN

## 2020-09-03 DIAGNOSIS — M25.511 ACUTE PAIN OF RIGHT SHOULDER: ICD-10-CM

## 2020-09-03 DIAGNOSIS — B07.9 VIRAL WARTS, UNSPECIFIED TYPE: Primary | ICD-10-CM

## 2020-09-03 DIAGNOSIS — R55 PRE-SYNCOPE: ICD-10-CM

## 2020-09-03 PROCEDURE — 99214 OFFICE O/P EST MOD 30 MIN: CPT | Performed by: INTERNAL MEDICINE

## 2020-09-03 PROCEDURE — 17250 CHEM CAUT OF GRANLTJ TISSUE: CPT | Performed by: INTERNAL MEDICINE

## 2020-09-03 NOTE — PROGRESS NOTES
Subjective     Chief Complaint   Patient presents with   • Results       History of Present Illness   Started playing football with right shoulder pain.   Monday morning patient put head back on the PT table and got dizzy and presyncopal.   PT was worried and wanted patient to get checked.   Patient is here to discuss US carotids.    Patient states that he has had this issue in the past. Has had carotid US in the past >5 years that was negative.   States that SX have been worse since sinus surgery.     Constant ringing in both ears.     Ongoing warts on the right arm X2    Patient's PMR from outside medical facility reviewed and noted.    Review of Systems   Constitutional: Negative for chills and fever.   HENT: Negative for congestion and rhinorrhea.    Respiratory: Negative for cough and shortness of breath.    Cardiovascular: Negative for chest pain and leg swelling.   Gastrointestinal: Negative for diarrhea, nausea and vomiting.   Musculoskeletal: Positive for arthralgias.   Skin: Positive for color change and wound.   Neurological: Positive for syncope. Negative for headaches.        Otherwise complete ROS reviewed and negative except as mentioned in the HPI.    Past Medical History:   Past Medical History:   Diagnosis Date   • Arthritis    • GERD (gastroesophageal reflux disease)    • Hyperlipidemia    • Hypertension      Past Surgical History:  Past Surgical History:   Procedure Laterality Date   • COLONOSCOPY     • ENDOSCOPIC FUNCTIONAL SINUS SURGERY (FESS) Bilateral 2/6/2020    Procedure: ENDOSCOPIC FUNCTIONAL SINUS SURGERY WITH SEPTOPLASTY AND RESECTION INFERIOR TURBINATES;  Surgeon: Cricket Olguin Jr., MD;  Location: [facility] OR;  Service: ENT;  Laterality: Bilateral;   • RESECTION OF NASAL TURBINATES Bilateral 2/6/2020    Procedure: RESECTION OF NASAL TURBINATES;  Surgeon: Cricket Olguin Jr., MD;  Location: [facility] OR;  Service: ENT;  Laterality: Bilateral;     Social History:  reports  "that he quit smoking about 22 years ago. His smoking use included cigarettes. He has a 33.00 pack-year smoking history. He has never used smokeless tobacco. He reports that he drinks alcohol. He reports that he does not use drugs.    Family History: family history includes Cancer in his mother; Tuberculosis in his maternal grandfather.       Allergies:  Allergies   Allergen Reactions   • Sulfa Antibiotics Shortness Of Breath   • Penicillins Rash     Happened 30 yrs + ago       Medications:  Prior to Admission medications    Medication Sig Start Date End Date Taking? Authorizing Provider   aspirin 81 MG chewable tablet Chew 1 tablet Daily. 7/13/20  Yes Elizabeth French DO   atorvastatin (LIPITOR) 20 MG tablet Take 1 tablet by mouth Daily. 7/13/20  Yes Elizabeth French DO   fluticasone (FLONASE) 50 MCG/ACT nasal spray 2 sprays into the nostril(s) as directed by provider 2 (Two) Times a Day. 7/13/20  Yes Elizabeth French DO   meloxicam (MOBIC) 15 MG tablet Take 15 mg by mouth Daily. Takes every other day for back 9/16/19  Yes Hao Otoole MD   metFORMIN (GLUCOPHAGE) 500 MG tablet Take 1 tablet by mouth 2 (Two) Times a Day. 7/13/20  Yes Elizabeth French DO   olmesartan (Benicar) 20 MG tablet Take 1 tablet by mouth Daily. 7/13/20  Yes Elizabeth French DO   omeprazole (priLOSEC) 20 MG capsule Take 1 capsule by mouth Daily. 7/13/20  Yes Elizabeth French DO   Turmeric 500 MG capsule Take  by mouth Daily.   Yes Hao Otoole MD   vitamin C (ASCORBIC ACID) 250 MG tablet Take 250 mg by mouth Daily.   Yes Hao Otoole MD       Objective     Vital Signs: /85 (BP Location: Left arm, Patient Position: Sitting, Cuff Size: Adult)   Pulse 99   Temp 98.2 °F (36.8 °C) (Infrared)   Ht 182.9 cm (72\")   Wt 95.3 kg (210 lb)   SpO2 98%   BMI 28.48 kg/m²   Physical Exam   Constitutional: He appears well-developed and well-nourished.   HENT:   Head: Normocephalic and atraumatic.   Nose: " Nose normal.   Mouth/Throat: Oropharynx is clear and moist.   Eyes: Conjunctivae and EOM are normal.   Neck: Normal range of motion. Neck supple.   Cardiovascular: Normal rate, regular rhythm and normal heart sounds.   Pulmonary/Chest: Effort normal and breath sounds normal.   Abdominal: Soft. Bowel sounds are normal.   Musculoskeletal: He exhibits no edema or tenderness.   Neurological: He is alert. No cranial nerve deficit.   Skin: Skin is warm and dry.   2 warty like lesions on the right forearm   Psychiatric: He has a normal mood and affect.   Vitals reviewed.      Patient's Body mass index is 28.48 kg/m². BMI is within normal parameters. No follow-up required..      Results Reviewed:  Glucose   Date Value Ref Range Status   01/30/2020 127 (H) 65 - 99 mg/dL Final     BUN   Date Value Ref Range Status   07/13/2020 16 6 - 24 mg/dL Final   01/30/2020 17 6 - 20 mg/dL Final     Creatinine   Date Value Ref Range Status   07/13/2020 0.71 (L) 0.76 - 1.27 mg/dL Final   01/30/2020 0.71 (L) 0.76 - 1.27 mg/dL Final     Sodium   Date Value Ref Range Status   07/13/2020 138 134 - 144 mmol/L Final   01/30/2020 139 136 - 145 mmol/L Final     Potassium   Date Value Ref Range Status   07/13/2020 4.5 3.5 - 5.2 mmol/L Final   01/30/2020 4.1 3.5 - 5.2 mmol/L Final     Chloride   Date Value Ref Range Status   07/13/2020 100 96 - 106 mmol/L Final   01/30/2020 103 98 - 107 mmol/L Final     CO2   Date Value Ref Range Status   01/30/2020 23.0 22.0 - 29.0 mmol/L Final     Total CO2   Date Value Ref Range Status   07/13/2020 21 20 - 29 mmol/L Final     Calcium   Date Value Ref Range Status   07/13/2020 9.4 8.7 - 10.2 mg/dL Final   01/30/2020 9.2 8.6 - 10.5 mg/dL Final     ALT (SGPT)   Date Value Ref Range Status   07/13/2020 75 (H) 0 - 44 IU/L Final     AST (SGOT)   Date Value Ref Range Status   07/13/2020 46 (H) 0 - 40 IU/L Final     WBC   Date Value Ref Range Status   07/13/2020 5.0 3.4 - 10.8 x10E3/uL Final     Hematocrit   Date Value  Ref Range Status   07/13/2020 45.7 37.5 - 51.0 % Final   01/30/2020 41.5 37.5 - 51.0 % Final     Platelets   Date Value Ref Range Status   07/13/2020 163 150 - 450 x10E3/uL Final   01/30/2020 168 140 - 450 10*3/mm3 Final     Triglycerides   Date Value Ref Range Status   07/13/2020 291 (H) 0 - 149 mg/dL Final     HDL Cholesterol   Date Value Ref Range Status   07/13/2020 33 (L) >39 mg/dL Final     LDL Cholesterol    Date Value Ref Range Status   07/13/2020 42 0 - 99 mg/dL Final     Hemoglobin A1C   Date Value Ref Range Status   07/13/2020 6.3 (H) 4.8 - 5.6 % Final     Comment:              Prediabetes: 5.7 - 6.4           Diabetes: >6.4           Glycemic control for adults with diabetes: <7.0           Assessment / Plan     Assessment/Plan:  1. Viral warts, unspecified type  - Cold freeze spray X2    2. Pre-syncope  - Carotid US  - Discussed Meniere's disease    3. Acute pain of right shoulder  - Recommended discussion with ortho for possible joint injection. Patient continues to have pain despite PT and DC.         Return if symptoms worsen or fail to improve, for Recheck, Next scheduled follow up. unless patient needs to be seen sooner or acute issues arise.    Code Status: Full    I have discussed the patient results/orders and and plan/recommendation with them at today's visit.      Elizabeth French, DO   09/03/2020

## 2020-11-24 DIAGNOSIS — E11.9 TYPE 2 DIABETES MELLITUS WITHOUT COMPLICATION, WITHOUT LONG-TERM CURRENT USE OF INSULIN (HCC): ICD-10-CM

## 2020-11-24 DIAGNOSIS — E78.2 ELEVATED TRIGLYCERIDES WITH HIGH CHOLESTEROL: ICD-10-CM

## 2020-11-24 RX ORDER — ATORVASTATIN CALCIUM 20 MG/1
20 TABLET, FILM COATED ORAL DAILY
Qty: 90 TABLET | Refills: 3 | Status: SHIPPED | OUTPATIENT
Start: 2020-11-24 | End: 2021-11-08

## 2021-01-14 ENCOUNTER — OFFICE VISIT (OUTPATIENT)
Dept: INTERNAL MEDICINE | Facility: CLINIC | Age: 61
End: 2021-01-14

## 2021-01-14 VITALS
TEMPERATURE: 96.1 F | HEIGHT: 72 IN | BODY MASS INDEX: 29.26 KG/M2 | HEART RATE: 94 BPM | WEIGHT: 216 LBS | DIASTOLIC BLOOD PRESSURE: 80 MMHG | OXYGEN SATURATION: 98 % | SYSTOLIC BLOOD PRESSURE: 130 MMHG

## 2021-01-14 DIAGNOSIS — R74.8 ELEVATED LIVER ENZYMES: ICD-10-CM

## 2021-01-14 DIAGNOSIS — E78.5 DYSLIPIDEMIA: Primary | ICD-10-CM

## 2021-01-14 PROCEDURE — 99213 OFFICE O/P EST LOW 20 MIN: CPT | Performed by: INTERNAL MEDICINE

## 2021-01-14 NOTE — PROGRESS NOTES
"        Subjective     Chief Complaint   Patient presents with   • Diabetes     6 mo fu       History of Present Illness  Patient states that he is snorting and draining. Nose doesn't feel right. Going back to see ENT.   Otherwise patient is doing well.   BP has been running good. Taking 1/2 of a 20 mg tablet.     Patient states that he had \"yellow jaundice\" when he was younger and has always had some liver issues.     Patient's PMR from outside medical facility reviewed and noted.    Review of Systems   Constitutional: Negative for chills and fever.   HENT: Positive for congestion and sinus pressure.    Respiratory: Negative for cough and shortness of breath.    Gastrointestinal: Negative for diarrhea, nausea and vomiting.   Genitourinary: Negative for dysuria and hematuria.   Skin: Negative for color change and wound.   Neurological: Negative for seizures and headaches.      Otherwise complete ROS reviewed and negative except as mentioned in the HPI.    Past Medical History:   Past Medical History:   Diagnosis Date   • Arthritis    • GERD (gastroesophageal reflux disease)    • Hyperlipidemia    • Hypertension      Past Surgical History:  Past Surgical History:   Procedure Laterality Date   • COLONOSCOPY     • ENDOSCOPIC FUNCTIONAL SINUS SURGERY (FESS) Bilateral 2/6/2020    Procedure: ENDOSCOPIC FUNCTIONAL SINUS SURGERY WITH SEPTOPLASTY AND RESECTION INFERIOR TURBINATES;  Surgeon: Cricket Olguin Jr., MD;  Location: [facility];  Service: ENT;  Laterality: Bilateral;   • RESECTION OF NASAL TURBINATES Bilateral 2/6/2020    Procedure: RESECTION OF NASAL TURBINATES;  Surgeon: Cricket Olguin Jr., MD;  Location: [facility] OR;  Service: ENT;  Laterality: Bilateral;     Social History:  reports that he quit smoking about 23 years ago. His smoking use included cigarettes. He has a 33.00 pack-year smoking history. He has never used smokeless tobacco. He reports current alcohol use. He reports that he does not use " "drugs.    Family History: family history includes Cancer in his mother; Tuberculosis in his maternal grandfather.      Allergies:  Allergies   Allergen Reactions   • Sulfa Antibiotics Shortness Of Breath   • Penicillins Rash     Happened 30 yrs + ago       Medications:  Prior to Admission medications    Medication Sig Start Date End Date Taking? Authorizing Provider   aspirin 81 MG chewable tablet Chew 1 tablet Daily. 7/13/20  Yes Elizabeth French DO   atorvastatin (LIPITOR) 20 MG tablet Take 1 tablet by mouth Daily. 11/24/20  Yes Elizabeth French DO   fluticasone (FLONASE) 50 MCG/ACT nasal spray 2 sprays into the nostril(s) as directed by provider 2 (Two) Times a Day. 7/13/20  Yes Elizabeth French DO   meloxicam (MOBIC) 15 MG tablet Take 15 mg by mouth Daily. Takes every other day for back 9/16/19  Yes Hao Otoole MD   metFORMIN (GLUCOPHAGE) 500 MG tablet Take 1 tablet by mouth 2 (Two) Times a Day. 11/24/20  Yes Elizabeth French DO   olmesartan (Benicar) 20 MG tablet Take 1 tablet by mouth Daily.  Patient taking differently: Take 20 mg by mouth Daily. Pt taking 1/2 tablet daily 7/13/20  Yes Elizabeth French DO   omeprazole (priLOSEC) 20 MG capsule Take 1 capsule by mouth Daily. 7/13/20  Yes Elizabeth French DO   Turmeric 500 MG capsule Take  by mouth Daily.   Yes Hao Otooel MD   vitamin C (ASCORBIC ACID) 250 MG tablet Take 250 mg by mouth Daily.   Yes Hao Otoole MD       Objective     Vital Signs: /80 (BP Location: Left arm, Patient Position: Sitting, Cuff Size: Adult)   Pulse 94   Temp 96.1 °F (35.6 °C) (Infrared)   Ht 182.9 cm (72\")   Wt 98 kg (216 lb)   SpO2 98%   BMI 29.29 kg/m²   Physical Exam  Vitals signs reviewed.   Constitutional:       Appearance: Normal appearance.   HENT:      Head: Normocephalic and atraumatic.      Nose: Nose normal.   Eyes:      General: No scleral icterus.     Conjunctiva/sclera: Conjunctivae normal.   Neck:      " Musculoskeletal: Normal range of motion and neck supple.   Cardiovascular:      Rate and Rhythm: Normal rate and regular rhythm.      Heart sounds: Normal heart sounds.   Pulmonary:      Effort: Pulmonary effort is normal.      Breath sounds: Normal breath sounds.   Musculoskeletal:         General: No swelling or tenderness.   Skin:     General: Skin is warm and dry.   Neurological:      Mental Status: He is alert.      Cranial Nerves: No cranial nerve deficit.   Psychiatric:         Mood and Affect: Mood normal.         Behavior: Behavior normal.       Patient's Body mass index is 29.29 kg/m². BMI is within normal parameters. No follow-up required..      Results Reviewed:  Glucose   Date Value Ref Range Status   01/30/2020 127 (H) 65 - 99 mg/dL Final     BUN   Date Value Ref Range Status   07/13/2020 16 6 - 24 mg/dL Final   01/30/2020 17 6 - 20 mg/dL Final     Creatinine   Date Value Ref Range Status   07/13/2020 0.71 (L) 0.76 - 1.27 mg/dL Final   01/30/2020 0.71 (L) 0.76 - 1.27 mg/dL Final     Sodium   Date Value Ref Range Status   07/13/2020 138 134 - 144 mmol/L Final   01/30/2020 139 136 - 145 mmol/L Final     Potassium   Date Value Ref Range Status   07/13/2020 4.5 3.5 - 5.2 mmol/L Final   01/30/2020 4.1 3.5 - 5.2 mmol/L Final     Chloride   Date Value Ref Range Status   07/13/2020 100 96 - 106 mmol/L Final   01/30/2020 103 98 - 107 mmol/L Final     CO2   Date Value Ref Range Status   01/30/2020 23.0 22.0 - 29.0 mmol/L Final     Total CO2   Date Value Ref Range Status   07/13/2020 21 20 - 29 mmol/L Final     Calcium   Date Value Ref Range Status   07/13/2020 9.4 8.7 - 10.2 mg/dL Final   01/30/2020 9.2 8.6 - 10.5 mg/dL Final     ALT (SGPT)   Date Value Ref Range Status   07/13/2020 75 (H) 0 - 44 IU/L Final     AST (SGOT)   Date Value Ref Range Status   07/13/2020 46 (H) 0 - 40 IU/L Final     WBC   Date Value Ref Range Status   07/13/2020 5.0 3.4 - 10.8 x10E3/uL Final     Hematocrit   Date Value Ref Range Status    07/13/2020 45.7 37.5 - 51.0 % Final   01/30/2020 41.5 37.5 - 51.0 % Final     Platelets   Date Value Ref Range Status   07/13/2020 163 150 - 450 x10E3/uL Final   01/30/2020 168 140 - 450 10*3/mm3 Final     Triglycerides   Date Value Ref Range Status   07/13/2020 291 (H) 0 - 149 mg/dL Final     HDL Cholesterol   Date Value Ref Range Status   07/13/2020 33 (L) >39 mg/dL Final     LDL Cholesterol    Date Value Ref Range Status   07/13/2020 42 0 - 99 mg/dL Final     Hemoglobin A1C   Date Value Ref Range Status   07/13/2020 6.3 (H) 4.8 - 5.6 % Final     Comment:              Prediabetes: 5.7 - 6.4           Diabetes: >6.4           Glycemic control for adults with diabetes: <7.0           Assessment / Plan     Assessment/Plan:  1. Dyslipidemia  - Lipid panel    2. Elevated liver enzymes  - Comprehensive metabolic panel    3. HTN  - Continue current therapy.     Return in about 6 months (around 7/14/2021) for Recheck, Next scheduled follow up. unless patient needs to be seen sooner or acute issues arise.    Code Status: Full    I have discussed the patient results/orders and and plan/recommendation with them at today's visit.      Elizabeth French, DO   01/14/2021

## 2021-01-15 LAB
ALBUMIN SERPL-MCNC: 4.5 G/DL (ref 3.8–4.9)
ALBUMIN/GLOB SERPL: 1.8 {RATIO} (ref 1.2–2.2)
ALP SERPL-CCNC: 91 IU/L (ref 39–117)
ALT SERPL-CCNC: 133 IU/L (ref 0–44)
AST SERPL-CCNC: 96 IU/L (ref 0–40)
BILIRUB SERPL-MCNC: 0.3 MG/DL (ref 0–1.2)
BUN SERPL-MCNC: 19 MG/DL (ref 8–27)
BUN/CREAT SERPL: 20 (ref 10–24)
CALCIUM SERPL-MCNC: 9.5 MG/DL (ref 8.6–10.2)
CHLORIDE SERPL-SCNC: 105 MMOL/L (ref 96–106)
CHOLEST SERPL-MCNC: 128 MG/DL (ref 100–199)
CO2 SERPL-SCNC: 19 MMOL/L (ref 20–29)
CREAT SERPL-MCNC: 0.93 MG/DL (ref 0.76–1.27)
GLOBULIN SER CALC-MCNC: 2.5 G/DL (ref 1.5–4.5)
GLUCOSE SERPL-MCNC: 192 MG/DL (ref 65–99)
HDLC SERPL-MCNC: 32 MG/DL
LDLC SERPL CALC-MCNC: 44 MG/DL (ref 0–99)
POTASSIUM SERPL-SCNC: 5.1 MMOL/L (ref 3.5–5.2)
PROT SERPL-MCNC: 7 G/DL (ref 6–8.5)
SODIUM SERPL-SCNC: 139 MMOL/L (ref 134–144)
TRIGL SERPL-MCNC: 351 MG/DL (ref 0–149)
VLDLC SERPL CALC-MCNC: 52 MG/DL (ref 5–40)

## 2021-04-06 DIAGNOSIS — G89.29 CHRONIC MIDLINE LOW BACK PAIN WITH RIGHT-SIDED SCIATICA: Primary | ICD-10-CM

## 2021-04-06 DIAGNOSIS — M54.41 CHRONIC MIDLINE LOW BACK PAIN WITH RIGHT-SIDED SCIATICA: Primary | ICD-10-CM

## 2021-04-06 RX ORDER — MELOXICAM 15 MG/1
15 TABLET ORAL DAILY
Qty: 90 TABLET | Refills: 3 | Status: SHIPPED | OUTPATIENT
Start: 2021-04-06 | End: 2022-02-28

## 2021-05-25 ENCOUNTER — OFFICE VISIT (OUTPATIENT)
Dept: OTOLARYNGOLOGY | Facility: CLINIC | Age: 61
End: 2021-05-25

## 2021-05-25 VITALS — HEART RATE: 91 BPM | WEIGHT: 207 LBS | HEIGHT: 72 IN | BODY MASS INDEX: 28.04 KG/M2

## 2021-05-25 DIAGNOSIS — J30.0 VASOMOTOR RHINITIS: ICD-10-CM

## 2021-05-25 DIAGNOSIS — J31.0 RHINITIS, CHRONIC: ICD-10-CM

## 2021-05-25 DIAGNOSIS — R09.82 PND (POST-NASAL DRIP): ICD-10-CM

## 2021-05-25 DIAGNOSIS — J32.9 CHRONIC SINUSITIS, UNSPECIFIED LOCATION: Primary | ICD-10-CM

## 2021-05-25 DIAGNOSIS — Z98.890 S/P SINUS SURGERY: ICD-10-CM

## 2021-05-25 PROCEDURE — 99214 OFFICE O/P EST MOD 30 MIN: CPT | Performed by: OTOLARYNGOLOGY

## 2021-05-25 RX ORDER — IPRATROPIUM BROMIDE 42 UG/1
2 SPRAY, METERED NASAL 4 TIMES DAILY PRN
Qty: 45 ML | Refills: 3 | Status: SHIPPED | OUTPATIENT
Start: 2021-05-25 | End: 2022-05-03

## 2021-05-25 NOTE — PATIENT INSTRUCTIONS
NASAL SALINE:  Use 2 puffs each nostril 4-6 times daily and more frequently if possible.  You can buy saline spray or you can make your own and use an old spray bottle to administer  Use a humidifier at bedside  Recipe for saline:  Water                                 1 quart  Salt (table)                        1 tablespoon  Gylcerin (or Poly Syrup)    1 teaspoon  Sodium bicarbonate           1 teaspoon  Sprays or Grayson pots are recommended    Nasal steroid use:  Using nasal steroids:  You will be prescribed one of the following nasal steroids: Flonase, Nasacort, Nasonex, Rhinocort, Qnasl, Zetonna  2 puffs each nostril 2 times daily  Start as soon as possible  If you are using Afrin for 3 days with the nasal steroid,  Use Afrin first and wait 10 minutes to allow the nose to open. Then administer nasal steroids.    Ipratromium Bromide (Atrovent) spray use 1-2 puffs each nostril 3-4 times daily AS needed for drainage      CONTACT INFORMATION:  The main office phone number is [phone]. For emergencies after hours and on weekends, this number will convert over to our answering service and the on call provider will answer. Please try to keep non emergent phone calls/ questions to office hours 9am-5pm Monday through Friday.     [employer]  As an alternative, you can sign up and use the Epic MyChart system for more direct and quicker access for non emergent questions/ problems.  [facility] [employer] allows you to send messages to your doctor, view your test results, renew your prescriptions, schedule appointments, and more. To sign up, go to [employer] and click on the Sign Up Now link in the New User? box. Enter your [employer] Activation Code exactly as it appears below along with the last four digits of your Social Security Number and your Date of Birth () to complete the sign-up process. If you do not sign up before the expiration date, you must request a new code.    [employer] Activation Code:  Activation code not generated  Current [employer] Status: Active    If you have questions, you can email [email or web address] or call [phone] to talk to our [employer] staff. Remember, [employer] is NOT to be used for urgent needs. For medical emergencies, dial 911.

## 2021-05-25 NOTE — PROGRESS NOTES
[facility] Otolaryngology Head and Neck Surgery  CLINIC NOTE    Chief Complaint   Patient presents with   • Follow-up     recheck nose          HPI   Follow up  Accompanied by:  No one  Chente Brandon Campbell is a 60 y.o. male who is here for follow up. He has had good breathing. He complains of post-nasal drainage.  He is doing well overall.  He says he is snorting a lot and has hacking cough. Nose feels like something in it.  Ears- itching  He is status post Endoscopic Functional Sinus Surgery With Septoplasty And Resection Inferior Turbinates - Bilateral and Resection Of Nasal Turbinates - Bilateral on 2/6/2020.        History     Last Reviewed by Cricket Olguin Jr., MD on 5/25/2021 at 11:18 AM    Sections Reviewed    Medical, Family, Tobacco, Surgical      Problem list reviewed by Cricket lOguin Jr., MD on 5/25/2021 at 11:18 AM  Medicines reviewed by Cricket Olguin Jr., MD on 5/25/2021 at 11:18 AM  Allergies reviewed by Cricket Olguin Jr., MD on 5/25/2021 at 11:18 AM         Vital Signs:   Heart Rate:  [91] 91    Physical Exam  Vitals reviewed.   Constitutional:       Appearance: Normal appearance. He is well-developed and overweight.   HENT:      Head: Normocephalic and atraumatic.      Right Ear: Tympanic membrane, ear canal and external ear normal. Decreased hearing noted.      Left Ear: Tympanic membrane, ear canal and external ear normal. Decreased hearing noted.      Nose: Mucosal edema (very mild) present. No septal deviation.      Right Turbinates: Not enlarged or swollen.      Left Turbinates: Not enlarged or swollen.      Mouth/Throat:      Lips: Pink.      Mouth: Mucous membranes are moist.      Dentition: Normal dentition. No dental tenderness.      Tongue: No lesions. Tongue does not deviate from midline.      Palate: No mass and lesions.      Tonsils: 0 on the right. 0 on the left.   Eyes:      General: Lids are normal. Gaze aligned appropriately.       Extraocular Movements: Extraocular movements intact.      Conjunctiva/sclera: Conjunctivae normal.   Neck:      Thyroid: No thyroid mass or thyromegaly.      Comments: Short, thick  Pulmonary:      Effort: Pulmonary effort is normal. No respiratory distress.      Breath sounds: No stridor.   Musculoskeletal:      Cervical back: Decreased range of motion.   Lymphadenopathy:      Cervical: No cervical adenopathy.   Skin:     Findings: No rash.   Neurological:      General: No focal deficit present.      Mental Status: He is alert and oriented to person, place, and time.      Cranial Nerves: Cranial nerves are intact. No cranial nerve deficit.      Gait: Gait is intact.   Psychiatric:         Attention and Perception: Attention and perception normal.         Behavior: Behavior normal. Behavior is cooperative.         Thought Content: Thought content normal.         Cognition and Memory: Cognition and memory normal.         Judgment: Judgment normal.             SnapShot   Notes   Encounters  Labs   Imaging   [email or web address]   Rslt Rev   :23}    Result Review    RESULTS REVIEW:    I have reviewed the patients old records in the chart.            Assessment:        Diagnosis Plan   1. Chronic sinusitis, unspecified location      Improved following surgery   2. Rhinitis, chronic      Improved following surgery   3. S/P sinus surgery     4. PND (post-nasal drip)      Main symptom   5. Vasomotor rhinitis      Moderate              Plan:        Conservative management.  Patient is doing quite well following sinus surgery.  His only complaint is that of postnasal drip.  I will start him on Atrovent to see if this helps.  I feel he has component of vasomotor rhinitis.  Atrovent for drainage  Flonase daily  Nasal saline     New Medications Ordered This Visit   Medications   • ipratropium (ATROVENT) 0.06 % nasal spray     Si sprays into the nostril(s) as directed by provider 4 (Four) Times a Day As Needed for Rhinitis. For  drainage     Dispense:  45 mL     Refill:  3          MY CHART:  Patient is Patient is using My Chart    Patient understand(s) and agree(s) with the treatment plan as described.    Return in about 3 months (around 8/25/2021) for Recheck Nose and Atrovent.            Cricket Olguin Jr, MD  05/25/21  11:22 CDT

## 2021-06-24 DIAGNOSIS — K21.9 GASTROESOPHAGEAL REFLUX DISEASE WITHOUT ESOPHAGITIS: ICD-10-CM

## 2021-06-28 RX ORDER — OMEPRAZOLE 20 MG/1
20 CAPSULE, DELAYED RELEASE ORAL DAILY
Qty: 30 CAPSULE | Refills: 0 | Status: SHIPPED | OUTPATIENT
Start: 2021-06-28 | End: 2021-08-24 | Stop reason: SDUPTHER

## 2021-08-02 DIAGNOSIS — I10 ESSENTIAL HYPERTENSION: Primary | ICD-10-CM

## 2021-08-02 RX ORDER — OLMESARTAN MEDOXOMIL 5 MG/1
5 TABLET ORAL DAILY
Qty: 90 TABLET | Refills: 3 | Status: SHIPPED | OUTPATIENT
Start: 2021-08-02 | End: 2022-03-17

## 2021-08-03 ENCOUNTER — OFFICE VISIT (OUTPATIENT)
Dept: INTERNAL MEDICINE | Facility: CLINIC | Age: 61
End: 2021-08-03

## 2021-08-03 VITALS
SYSTOLIC BLOOD PRESSURE: 155 MMHG | WEIGHT: 205 LBS | HEIGHT: 72 IN | DIASTOLIC BLOOD PRESSURE: 87 MMHG | OXYGEN SATURATION: 99 % | HEART RATE: 77 BPM | BODY MASS INDEX: 27.77 KG/M2 | TEMPERATURE: 97.5 F

## 2021-08-03 DIAGNOSIS — Z12.11 COLON CANCER SCREENING: Primary | ICD-10-CM

## 2021-08-03 DIAGNOSIS — I10 ESSENTIAL HYPERTENSION: ICD-10-CM

## 2021-08-03 DIAGNOSIS — E78.5 DYSLIPIDEMIA: ICD-10-CM

## 2021-08-03 DIAGNOSIS — Z12.5 PROSTATE CANCER SCREENING: ICD-10-CM

## 2021-08-03 DIAGNOSIS — E11.649 TYPE 2 DIABETES MELLITUS WITH HYPOGLYCEMIA WITHOUT COMA, WITHOUT LONG-TERM CURRENT USE OF INSULIN (HCC): ICD-10-CM

## 2021-08-03 LAB — HBA1C MFR BLD: 6 %

## 2021-08-03 PROCEDURE — 99214 OFFICE O/P EST MOD 30 MIN: CPT | Performed by: INTERNAL MEDICINE

## 2021-08-03 PROCEDURE — 83036 HEMOGLOBIN GLYCOSYLATED A1C: CPT | Performed by: INTERNAL MEDICINE

## 2021-08-03 NOTE — PROGRESS NOTES
Subjective     Chief Complaint   Patient presents with   • Dyslipidemia     6 mo fu,  started working out and BP started droping so stop taking meds.  and then bp started to rise. so i made apt.        History of Present Illness  Patient has been working out and BP has been controlled.  Has had hypoglycemic episodes.   Has cut  Back on his NP and glucose medications.     Patient has recently seen ENT.   Diastasis hernia. Non- tender. Occasional spasms.     Patient's PMR from outside medical facility reviewed and noted.    Review of Systems   Constitutional: Negative for chills and fever.   HENT: Positive for hearing loss. Negative for congestion and rhinorrhea.    Respiratory: Negative for cough and shortness of breath.    Cardiovascular: Negative for chest pain and leg swelling.   Gastrointestinal: Negative for blood in stool, constipation and diarrhea.   Genitourinary: Negative for dysuria and hematuria.   Neurological: Negative for seizures and headaches.   Psychiatric/Behavioral: Negative for dysphoric mood. The patient is not nervous/anxious.       Otherwise complete ROS reviewed and negative except as mentioned in the HPI.    Past Medical History:   Past Medical History:   Diagnosis Date   • Arthritis    • GERD (gastroesophageal reflux disease)    • Hyperlipidemia    • Hypertension      Past Surgical History:  Past Surgical History:   Procedure Laterality Date   • COLONOSCOPY     • ENDOSCOPIC FUNCTIONAL SINUS SURGERY (FESS) Bilateral 2/6/2020    Procedure: ENDOSCOPIC FUNCTIONAL SINUS SURGERY WITH SEPTOPLASTY AND RESECTION INFERIOR TURBINATES;  Surgeon: Cricket Olguin Jr., MD;  Location: [facility] OR;  Service: ENT;  Laterality: Bilateral;   • RESECTION OF NASAL TURBINATES Bilateral 2/6/2020    Procedure: RESECTION OF NASAL TURBINATES;  Surgeon: Cricket Olguin Jr., MD;  Location: [facility] OR;  Service: ENT;  Laterality: Bilateral;     Social History:  reports that he quit smoking about 23 years  ago. His smoking use included cigarettes. He has a 33.00 pack-year smoking history. He has never used smokeless tobacco. He reports current alcohol use. He reports that he does not use drugs.    Family History: family history includes Cancer in his mother; Tuberculosis in his maternal grandfather.      Allergies:  Allergies   Allergen Reactions   • Sulfa Antibiotics Shortness Of Breath   • Penicillins Rash     Happened 30 yrs + ago       Medications:  Prior to Admission medications    Medication Sig Start Date End Date Taking? Authorizing Provider   aspirin 81 MG chewable tablet Chew 1 tablet Daily. 7/13/20  Yes Elizabeth French DO   atorvastatin (LIPITOR) 20 MG tablet Take 1 tablet by mouth Daily. 11/24/20  Yes Elizabeth French DO   fluticasone (FLONASE) 50 MCG/ACT nasal spray 2 sprays into the nostril(s) as directed by provider 2 (Two) Times a Day. 7/13/20  Yes Elizabeth French DO   ipratropium (ATROVENT) 0.06 % nasal spray 2 sprays into the nostril(s) as directed by provider 4 (Four) Times a Day As Needed for Rhinitis. For drainage 5/25/21  Yes Cricket Olguin Jr., MD   meloxicam (MOBIC) 15 MG tablet Take 1 tablet by mouth Daily. Takes every other day for back 4/6/21  Yes Elizabeth French DO   metFORMIN (GLUCOPHAGE) 500 MG tablet Take 1 tablet by mouth 2 (Two) Times a Day.  Patient taking differently: Take 500 mg by mouth 2 (Two) Times a Day. Taking 1 daily instead 11/24/20  Yes Elizabeth French DO   olmesartan (Benicar) 5 MG tablet Take 1 tablet by mouth Daily. 8/2/21  Yes Elizabeth French DO   omeprazole (priLOSEC) 20 MG capsule Take 1 capsule by mouth Daily. 6/28/21  Yes Elizabeth French DO   Turmeric 500 MG capsule Take  by mouth Daily.   Yes Hao Otoole MD   vitamin C (ASCORBIC ACID) 250 MG tablet Take 250 mg by mouth Daily.  8/3/21  Hao Otoole MD       Objective     Vital Signs: /87 (BP Location: Left arm, Patient Position: Sitting, Cuff Size: Adult)    "Pulse 77   Temp 97.5 °F (36.4 °C) (Infrared)   Ht 182.9 cm (72\")   Wt 93 kg (205 lb)   SpO2 99%   BMI 27.80 kg/m²   Physical Exam  Constitutional:       Appearance: Normal appearance.   HENT:      Head: Normocephalic and atraumatic.      Right Ear: There is impacted cerumen.      Left Ear: There is impacted cerumen.      Mouth/Throat:      Mouth: Mucous membranes are moist.      Pharynx: No oropharyngeal exudate.   Eyes:      General: No scleral icterus.     Extraocular Movements: Extraocular movements intact.   Cardiovascular:      Rate and Rhythm: Normal rate and regular rhythm.   Pulmonary:      Effort: Pulmonary effort is normal. No respiratory distress.      Breath sounds: Normal breath sounds.   Abdominal:      General: Abdomen is flat. There is no distension.      Tenderness: There is no abdominal tenderness.      Comments: Diastasis hernia. Non-tender.    Musculoskeletal:         General: No swelling. Normal range of motion.   Skin:     General: Skin is warm.      Coloration: Skin is not jaundiced.   Neurological:      General: No focal deficit present.      Mental Status: He is alert.      Cranial Nerves: No cranial nerve deficit.   Psychiatric:         Behavior: Behavior normal.       Patient's Body mass index is 27.8 kg/m². indicating that he is within normal range (BMI 18.5-24.9). No BMI management plan needed..      Results Reviewed:  Glucose   Date Value Ref Range Status   01/30/2020 127 (H) 65 - 99 mg/dL Final     BUN   Date Value Ref Range Status   01/14/2021 19 8 - 27 mg/dL Final   01/30/2020 17 6 - 20 mg/dL Final     Creatinine   Date Value Ref Range Status   01/14/2021 0.93 0.76 - 1.27 mg/dL Final   01/30/2020 0.71 (L) 0.76 - 1.27 mg/dL Final     Sodium   Date Value Ref Range Status   01/14/2021 139 134 - 144 mmol/L Final   01/30/2020 139 136 - 145 mmol/L Final     Potassium   Date Value Ref Range Status   01/14/2021 5.1 3.5 - 5.2 mmol/L Final   01/30/2020 4.1 3.5 - 5.2 mmol/L Final "     Chloride   Date Value Ref Range Status   01/14/2021 105 96 - 106 mmol/L Final   01/30/2020 103 98 - 107 mmol/L Final     CO2   Date Value Ref Range Status   01/30/2020 23.0 22.0 - 29.0 mmol/L Final     Total CO2   Date Value Ref Range Status   01/14/2021 19 (L) 20 - 29 mmol/L Final     Calcium   Date Value Ref Range Status   01/14/2021 9.5 8.6 - 10.2 mg/dL Final   01/30/2020 9.2 8.6 - 10.5 mg/dL Final     ALT (SGPT)   Date Value Ref Range Status   01/14/2021 133 (H) 0 - 44 IU/L Final     AST (SGOT)   Date Value Ref Range Status   01/14/2021 96 (H) 0 - 40 IU/L Final     WBC   Date Value Ref Range Status   07/13/2020 5.0 3.4 - 10.8 x10E3/uL Final     Hematocrit   Date Value Ref Range Status   07/13/2020 45.7 37.5 - 51.0 % Final   01/30/2020 41.5 37.5 - 51.0 % Final     Platelets   Date Value Ref Range Status   07/13/2020 163 150 - 450 x10E3/uL Final   01/30/2020 168 140 - 450 10*3/mm3 Final     Triglycerides   Date Value Ref Range Status   01/14/2021 351 (H) 0 - 149 mg/dL Final     HDL Cholesterol   Date Value Ref Range Status   01/14/2021 32 (L) >39 mg/dL Final     LDL Chol Calc (NIH)   Date Value Ref Range Status   01/14/2021 44 0 - 99 mg/dL Final     Hemoglobin A1C   Date Value Ref Range Status   07/13/2020 6.3 (H) 4.8 - 5.6 % Final     Comment:              Prediabetes: 5.7 - 6.4           Diabetes: >6.4           Glycemic control for adults with diabetes: <7.0           Assessment / Plan     Assessment/Plan:  1. Colon cancer screening  - Cologuard - Stool, Per Rectum; Future    2. Type 2 diabetes mellitus with hypoglycemia without coma, without long-term current use of insulin (CMS/[facility])  - POC Glycosylated Hemoglobin (Hb A1C)  - T4  - TSH    3. Essential hypertension  - CBC w AUTO Differential  - Comprehensive metabolic panel    4. Dyslipidemia  - Lipid panel    5. Prostate cancer screening  - PSA SCREENING    6. Bilateral cerumen impaction  - Discussed debrox       Return in about 6 months (around 2/3/2022)  for Recheck, Next scheduled follow up. unless patient needs to be seen sooner or acute issues arise.    Code Status: Full    I have discussed the patient results/orders and and plan/recommendation with them at today's visit.      Elizabeth French, DO   08/03/2021

## 2021-08-04 LAB
ALBUMIN SERPL-MCNC: 4.6 G/DL (ref 3.8–4.8)
ALBUMIN/GLOB SERPL: 1.8 {RATIO} (ref 1.2–2.2)
ALP SERPL-CCNC: 77 IU/L (ref 48–121)
ALT SERPL-CCNC: 99 IU/L (ref 0–44)
AST SERPL-CCNC: 89 IU/L (ref 0–40)
BASOPHILS # BLD AUTO: 0.1 X10E3/UL (ref 0–0.2)
BASOPHILS NFR BLD AUTO: 1 %
BILIRUB SERPL-MCNC: 0.6 MG/DL (ref 0–1.2)
BUN SERPL-MCNC: 19 MG/DL (ref 8–27)
BUN/CREAT SERPL: 21 (ref 10–24)
CALCIUM SERPL-MCNC: 9.4 MG/DL (ref 8.6–10.2)
CHLORIDE SERPL-SCNC: 103 MMOL/L (ref 96–106)
CHOLEST SERPL-MCNC: 149 MG/DL (ref 100–199)
CO2 SERPL-SCNC: 21 MMOL/L (ref 20–29)
CREAT SERPL-MCNC: 0.9 MG/DL (ref 0.76–1.27)
EOSINOPHIL # BLD AUTO: 0.1 X10E3/UL (ref 0–0.4)
EOSINOPHIL NFR BLD AUTO: 3 %
ERYTHROCYTE [DISTWIDTH] IN BLOOD BY AUTOMATED COUNT: 12.6 % (ref 11.6–15.4)
GLOBULIN SER CALC-MCNC: 2.6 G/DL (ref 1.5–4.5)
GLUCOSE SERPL-MCNC: 146 MG/DL (ref 65–99)
HCT VFR BLD AUTO: 47 % (ref 37.5–51)
HDLC SERPL-MCNC: 38 MG/DL
HGB BLD-MCNC: 16.4 G/DL (ref 13–17.7)
IMM GRANULOCYTES # BLD AUTO: 0 X10E3/UL (ref 0–0.1)
IMM GRANULOCYTES NFR BLD AUTO: 0 %
LDLC SERPL CALC-MCNC: 74 MG/DL (ref 0–99)
LYMPHOCYTES # BLD AUTO: 1.3 X10E3/UL (ref 0.7–3.1)
LYMPHOCYTES NFR BLD AUTO: 30 %
MCH RBC QN AUTO: 32.9 PG (ref 26.6–33)
MCHC RBC AUTO-ENTMCNC: 34.9 G/DL (ref 31.5–35.7)
MCV RBC AUTO: 94 FL (ref 79–97)
MONOCYTES # BLD AUTO: 0.3 X10E3/UL (ref 0.1–0.9)
MONOCYTES NFR BLD AUTO: 8 %
NEUTROPHILS # BLD AUTO: 2.5 X10E3/UL (ref 1.4–7)
NEUTROPHILS NFR BLD AUTO: 58 %
PLATELET # BLD AUTO: 125 X10E3/UL (ref 150–450)
POTASSIUM SERPL-SCNC: 4.5 MMOL/L (ref 3.5–5.2)
PROT SERPL-MCNC: 7.2 G/DL (ref 6–8.5)
PSA SERPL-MCNC: 1.3 NG/ML (ref 0–4)
RBC # BLD AUTO: 4.99 X10E6/UL (ref 4.14–5.8)
SODIUM SERPL-SCNC: 138 MMOL/L (ref 134–144)
T4 SERPL-MCNC: 7.4 UG/DL (ref 4.5–12)
TRIGL SERPL-MCNC: 226 MG/DL (ref 0–149)
TSH SERPL DL<=0.005 MIU/L-ACNC: 2.51 UIU/ML (ref 0.45–4.5)
VLDLC SERPL CALC-MCNC: 37 MG/DL (ref 5–40)
WBC # BLD AUTO: 4.2 X10E3/UL (ref 3.4–10.8)

## 2021-08-24 DIAGNOSIS — K21.9 GASTROESOPHAGEAL REFLUX DISEASE WITHOUT ESOPHAGITIS: ICD-10-CM

## 2021-08-24 RX ORDER — OMEPRAZOLE 20 MG/1
20 CAPSULE, DELAYED RELEASE ORAL DAILY
Qty: 90 CAPSULE | Refills: 3 | Status: SHIPPED | OUTPATIENT
Start: 2021-08-24 | End: 2022-07-18

## 2021-08-31 ENCOUNTER — OFFICE VISIT (OUTPATIENT)
Dept: OTOLARYNGOLOGY | Facility: CLINIC | Age: 61
End: 2021-08-31

## 2021-08-31 VITALS
BODY MASS INDEX: 28.17 KG/M2 | SYSTOLIC BLOOD PRESSURE: 136 MMHG | WEIGHT: 208 LBS | HEIGHT: 72 IN | HEART RATE: 94 BPM | DIASTOLIC BLOOD PRESSURE: 77 MMHG

## 2021-08-31 DIAGNOSIS — J31.0 RHINITIS, CHRONIC: ICD-10-CM

## 2021-08-31 DIAGNOSIS — J32.9 CHRONIC SINUSITIS, UNSPECIFIED LOCATION: Primary | ICD-10-CM

## 2021-08-31 DIAGNOSIS — R09.82 PND (POST-NASAL DRIP): ICD-10-CM

## 2021-08-31 DIAGNOSIS — Z98.890 S/P SINUS SURGERY: ICD-10-CM

## 2021-08-31 PROCEDURE — 99213 OFFICE O/P EST LOW 20 MIN: CPT | Performed by: OTOLARYNGOLOGY

## 2021-08-31 PROCEDURE — 31231 NASAL ENDOSCOPY DX: CPT | Performed by: OTOLARYNGOLOGY

## 2021-08-31 NOTE — PROGRESS NOTES
[facility] Otolaryngology Head and Neck Surgery  CLINIC NOTE    Chief Complaint   Patient presents with   • Follow-up     recheck sinus          HPI   Follow up  Accompanied by:  No one  Chente Brandon Campbell is a 61 y.o. male who is here for follow up. He has had post-nasal drainage. He is breathing well. Throat drainage is worst symptoms.  Atrovent helps drainage. He notes jittery with Flonase.  Heart would race at night.  He will snort at times.  No snoring.  He has drainage during day, able to sleep at night.  He is status post Endoscopic Functional Sinus Surgery With Septoplasty And Resection Inferior Turbinates - Bilateral and Resection Of Nasal Turbinates - Bilateral on 2/6/2020.        History     Last Reviewed by Cricket Olguin Jr., MD on 8/31/2021 at  9:16 AM    Sections Reviewed    Medical, Family, Tobacco, Surgical      Problem list reviewed by Cricket Olguin Jr., MD on 8/31/2021 at  9:16 AM  Medicines reviewed by Cricket Olguin Jr., MD on 8/31/2021 at  9:16 AM  Allergies reviewed by Cricket Olguin Jr., MD on 8/31/2021 at  9:16 AM         Vital Signs:   Heart Rate:  [94] 94  BP: (136)/(77) 136/77    Physical Exam  Vitals reviewed.   Constitutional:       Appearance: Normal appearance. He is well-developed and overweight.   HENT:      Head: Normocephalic and atraumatic.      Right Ear: Tympanic membrane, ear canal and external ear normal. Decreased hearing noted.      Left Ear: Tympanic membrane, ear canal and external ear normal. Decreased hearing noted.      Nose: Mucosal edema (very mild) present. No septal deviation.      Right Turbinates: Not enlarged or swollen.      Left Turbinates: Not enlarged or swollen.      Mouth/Throat:      Lips: Pink.      Mouth: Mucous membranes are moist.      Dentition: Normal dentition. No dental tenderness.      Tongue: No lesions. Tongue does not deviate from midline.      Palate: No mass and lesions.      Tonsils: 0 on  the right. 0 on the left.   Eyes:      General: Lids are normal. Gaze aligned appropriately.      Extraocular Movements: Extraocular movements intact.      Conjunctiva/sclera: Conjunctivae normal.   Neck:      Thyroid: No thyroid mass or thyromegaly.      Comments: Short, thick  Pulmonary:      Effort: Pulmonary effort is normal. No respiratory distress.      Breath sounds: No stridor.   Musculoskeletal:      Cervical back: Decreased range of motion.   Lymphadenopathy:      Cervical: No cervical adenopathy.   Skin:     Findings: No rash.   Neurological:      General: No focal deficit present.      Mental Status: He is alert and oriented to person, place, and time.      Cranial Nerves: Cranial nerves are intact. No cranial nerve deficit.      Gait: Gait is intact.   Psychiatric:         Attention and Perception: Attention and perception normal.         Behavior: Behavior normal. Behavior is cooperative.         Thought Content: Thought content normal.         Cognition and Memory: Cognition and memory normal.         Judgment: Judgment normal.               Result Review    RESULTS REVIEW:    I have reviewed the patients old records in the chart.            Assessment:        Diagnosis Plan   1. Chronic sinusitis, unspecified location      Improved since surgery   2. S/P sinus surgery     3. PND (post-nasal drip)      Persistent symptom   4. Rhinitis, chronic      Persistent              Plan:        Continue current management plan.  Patient appears to have granulated nasal mucosa without obstruction.  Because of side effects of medication, will stop the Flonase now and continue the Atrovent.  If he has no change in symptoms, including heart racing, I will plan to put Flonase back in his nose and stop the Atrovent.  He is to check with his primary care to see if the heart racing can be something else.  Stop Flonase  Continue Atrovent  Reflux precautions  Exercise  See PCP for heart racing             MY  CHART:  Patient is Patient is using My Chart    Patient understand(s) and agree(s) with the treatment plan as described.    Return in about 6 weeks (around 10/12/2021) for Recheck Nose and PND.            Cricket Olguin Jr, MD  08/31/21  09:24 CDT

## 2021-08-31 NOTE — PROGRESS NOTES
ENT PROCEDURE NOTE:  Anesthesia: topical 4% tetracaine and oxymetazoline mix    Endoscopy Type:  Nasal Endoscopic Examination    Indications:   1. Chronic sinusitis, unspecified location    2. S/P sinus surgery    3. PND (post-nasal drip)    4. Rhinitis, chronic         Procedure Details:    The patient was placed in the sitting position. After topical anesthesia and decongestion,  a rigid nasal endoscope  0 degree was passed along the nasal floor to the nasopharynx.  The scope was then passed to the middle and superior aspects of the nasal cavity. Systematic examination of the nasal cavity, nasopharynx and structures was performed.     Findings:  NASAL ENDOSCOPIC FINDINGS:    Nasal endoscopy   NASALMUCOSA:    abnormal:        Bilateral- Granulated, red, boggy    NASAL PASSAGES:     normal, patent    NASAL VALVE:     intact, good support and no nasal obstruction   SEPTUM:     mucosa abnormal   Bilateral- Granulated, red, boggy     midline   INFERIOR TURBINATES:     abnormal  Bilateral- Ventilated, red, boggy     resected:  Bilateral- Anterior inferior to posterior superior    MIDDLE TURBINATES:     abnormal:        BILATERAL: Granulated red, boggy   MIDDLE MEATUS:       findings       BILATERAL: Wet, granulated, no mucopus, no polyp   SPHENO-ETHMOID RECESS:    normal, normal sphenoid ostia. no mucopus, non-surgical   NASOPHARYNX:     Adenoids:  Red, thickened     Eustachian tubes:        BILATERAL: And, thickened   SECRETIONS:     abnormal Bilateral- Clear to cloudy, moderate       Condition:  Stable.  Patient tolerated procedure well.    Complications:  None    Post-procedure instructions reviewed with Patient  Instructions documented in AVS for patient to review

## 2021-08-31 NOTE — PATIENT INSTRUCTIONS
Stop flonase    NASAL SALINE:  Use 2 puffs each nostril 4-6 times daily and more frequently if possible.  You can buy saline spray or you can make your own and use an old spray bottle to administer  Use a humidifier at bedside  Recipe for saline:  Water                                 1 quart  Salt (table)                        1 tablespoon  Gylcerin (or Poly Syrup)    1 teaspoon  Sodium bicarbonate           1 teaspoon  Sprays or Mira pots are recommended    Continue Atrovent for now    See Family doctor for heart racing    CONTACT INFORMATION:  The main office phone number is [phone]. For emergencies after hours and on weekends, this number will convert over to our answering service and the on call provider will answer. Please try to keep non emergent phone calls/ questions to office hours 9am-5pm Monday through Friday.     [employer]  As an alternative, you can sign up and use the Epic MyChart system for more direct and quicker access for non emergent questions/ problems.  [employer] allows you to send messages to your doctor, view your test results, renew your prescriptions, schedule appointments, and more. To sign up, go to [employer] and click on the Sign Up Now link in the New User? box. Enter your [employer] Activation Code exactly as it appears below along with the last four digits of your Social Security Number and your Date of Birth () to complete the sign-up process. If you do not sign up before the expiration date, you must request a new code.    [employer] Activation Code: Activation code not generated  Current TV[employer]iles Status: Active    If you have questions, you can email [email or web address] or call [phone] to talk to our [employer] staff. Remember, [employer] is NOT to be used for urgent needs. For medical emergencies, dial 911.

## 2021-09-22 ENCOUNTER — CLINICAL SUPPORT (OUTPATIENT)
Dept: INTERNAL MEDICINE | Facility: CLINIC | Age: 61
End: 2021-09-22

## 2021-09-22 DIAGNOSIS — Z92.29 STATUS POST ADMINISTRATION OF ALL DOSES OF COVID-19 VACCINE SERIES: ICD-10-CM

## 2021-09-22 DIAGNOSIS — D69.1 ABNORMAL PLATELETS (HCC): Primary | ICD-10-CM

## 2021-09-22 PROCEDURE — 36415 COLL VENOUS BLD VENIPUNCTURE: CPT | Performed by: INTERNAL MEDICINE

## 2021-09-22 NOTE — PROGRESS NOTES
Venipuncture Blood Specimen Collection  Venipuncture performed in right hand by Nena Bales MA with good hemostasis. Patient tolerated the procedure well without complications.   09/22/21   Nena Bales MA

## 2021-09-23 LAB
BASOPHILS # BLD AUTO: 0.1 X10E3/UL (ref 0–0.2)
BASOPHILS NFR BLD AUTO: 1 %
EOSINOPHIL # BLD AUTO: 0.1 X10E3/UL (ref 0–0.4)
EOSINOPHIL NFR BLD AUTO: 3 %
ERYTHROCYTE [DISTWIDTH] IN BLOOD BY AUTOMATED COUNT: 12.6 % (ref 11.6–15.4)
HCT VFR BLD AUTO: 43.2 % (ref 37.5–51)
HGB BLD-MCNC: 15 G/DL (ref 13–17.7)
IMM GRANULOCYTES # BLD AUTO: 0 X10E3/UL (ref 0–0.1)
IMM GRANULOCYTES NFR BLD AUTO: 1 %
LYMPHOCYTES # BLD AUTO: 1.4 X10E3/UL (ref 0.7–3.1)
LYMPHOCYTES NFR BLD AUTO: 33 %
MCH RBC QN AUTO: 32.6 PG (ref 26.6–33)
MCHC RBC AUTO-ENTMCNC: 34.7 G/DL (ref 31.5–35.7)
MCV RBC AUTO: 94 FL (ref 79–97)
MONOCYTES # BLD AUTO: 0.3 X10E3/UL (ref 0.1–0.9)
MONOCYTES NFR BLD AUTO: 7 %
NEUTROPHILS # BLD AUTO: 2.4 X10E3/UL (ref 1.4–7)
NEUTROPHILS NFR BLD AUTO: 55 %
PLATELET # BLD AUTO: 141 X10E3/UL (ref 150–450)
RBC # BLD AUTO: 4.6 X10E6/UL (ref 4.14–5.8)
SARS-COV-2 AB SERPL IA-ACNC: 1002 U/ML
SARS-COV-2 AB SERPL-IMP: POSITIVE
WBC # BLD AUTO: 4.3 X10E3/UL (ref 3.4–10.8)

## 2021-09-23 NOTE — PROGRESS NOTES
Platelets low but improving. Can repeat in 4 weeks to see if we have achieved a normal range. Positive COVID antibody.

## 2021-11-07 DIAGNOSIS — E11.9 TYPE 2 DIABETES MELLITUS WITHOUT COMPLICATION, WITHOUT LONG-TERM CURRENT USE OF INSULIN (HCC): ICD-10-CM

## 2021-11-07 DIAGNOSIS — E78.2 ELEVATED TRIGLYCERIDES WITH HIGH CHOLESTEROL: ICD-10-CM

## 2021-11-08 RX ORDER — ATORVASTATIN CALCIUM 20 MG/1
TABLET, FILM COATED ORAL
Qty: 90 TABLET | Refills: 3 | Status: SHIPPED | OUTPATIENT
Start: 2021-11-08 | End: 2022-11-05 | Stop reason: SDUPTHER

## 2021-11-08 NOTE — TELEPHONE ENCOUNTER
Rx Refill Note  Requested Prescriptions     Pending Prescriptions Disp Refills   • atorvastatin (LIPITOR) 20 MG tablet [Pharmacy Med Name: atorvastatin 20 mg tablet] 90 tablet 3     Sig: TAKE ONE TABLET BY MOUTH DAILY   • metFORMIN (GLUCOPHAGE) 500 MG tablet [Pharmacy Med Name: metformin 500 mg tablet] 180 tablet 3     Sig: TAKE ONE TABLET BY MOUTH TWICE DAILY   Med last filled:    atorvastatin: 11/24/20   metformin:  11/24/20  Last office visit with prescribing clinician: 8/3/2021      Next office visit with prescribing clinician: Visit date not found            Hetal Hancock RN  11/08/21, 07:17 CST

## 2022-01-05 RX ORDER — AZITHROMYCIN 250 MG/1
TABLET, FILM COATED ORAL
Qty: 6 TABLET | Refills: 0 | Status: SHIPPED | OUTPATIENT
Start: 2022-01-05 | End: 2022-03-17

## 2022-01-05 RX ORDER — METHYLPREDNISOLONE 4 MG/1
TABLET ORAL
Qty: 21 TABLET | Refills: 0 | Status: SHIPPED | OUTPATIENT
Start: 2022-01-05 | End: 2022-03-17

## 2022-02-27 DIAGNOSIS — M54.41 CHRONIC MIDLINE LOW BACK PAIN WITH RIGHT-SIDED SCIATICA: ICD-10-CM

## 2022-02-27 DIAGNOSIS — G89.29 CHRONIC MIDLINE LOW BACK PAIN WITH RIGHT-SIDED SCIATICA: ICD-10-CM

## 2022-02-28 RX ORDER — MELOXICAM 15 MG/1
TABLET ORAL
Qty: 90 TABLET | Refills: 3 | Status: SHIPPED | OUTPATIENT
Start: 2022-02-28 | End: 2023-02-15

## 2022-02-28 NOTE — TELEPHONE ENCOUNTER
Rx Refill Note  Requested Prescriptions     Pending Prescriptions Disp Refills   • meloxicam (MOBIC) 15 MG tablet [Pharmacy Med Name: meloxicam 15 mg tablet] 90 tablet 3     Sig: TAKE ONE TABLET BY MOUTH DAILY      Last office visit with prescribing clinician: 8/3/2021      Next office visit with prescribing clinician: Visit date not found            Karissa Penny  02/28/22, 10:15 CST

## 2022-03-17 ENCOUNTER — OFFICE VISIT (OUTPATIENT)
Dept: INTERNAL MEDICINE | Facility: CLINIC | Age: 62
End: 2022-03-17

## 2022-03-17 VITALS
HEIGHT: 72 IN | DIASTOLIC BLOOD PRESSURE: 78 MMHG | OXYGEN SATURATION: 99 % | TEMPERATURE: 96.6 F | BODY MASS INDEX: 27.9 KG/M2 | SYSTOLIC BLOOD PRESSURE: 165 MMHG | HEART RATE: 86 BPM | WEIGHT: 206 LBS

## 2022-03-17 DIAGNOSIS — E11.9 TYPE 2 DIABETES MELLITUS WITHOUT COMPLICATION, WITHOUT LONG-TERM CURRENT USE OF INSULIN: Primary | ICD-10-CM

## 2022-03-17 DIAGNOSIS — I10 ESSENTIAL HYPERTENSION: ICD-10-CM

## 2022-03-17 LAB
EXPIRATION DATE: ABNORMAL
HBA1C MFR BLD: 6.5 %
Lab: ABNORMAL

## 2022-03-17 PROCEDURE — 83036 HEMOGLOBIN GLYCOSYLATED A1C: CPT | Performed by: INTERNAL MEDICINE

## 2022-03-17 PROCEDURE — 99213 OFFICE O/P EST LOW 20 MIN: CPT | Performed by: INTERNAL MEDICINE

## 2022-03-17 NOTE — PROGRESS NOTES
Subjective     Chief Complaint   Patient presents with   • Diabetes     Follow up     • Hypertension       History of Present Illness  BP here is elevated but he states that he checks it at home nightly and it is 115/70. Not currently taking the BP medications.     Only complaint in life is nasal congestion. He has seen ENT in the past. Stopped on e of his nasal sprays due to palpitations.     He is going to the gym and doing the elliptical.     Has increased the Tumeric to 1000. Going to try and wean the Mobic, now only taking 3 times a week.     Patient's PMR from outside medical facility reviewed and noted.    Review of Systems   Constitutional: Negative for chills and fever.   HENT: Negative for congestion and rhinorrhea.    Respiratory: Negative for cough and shortness of breath.    Cardiovascular: Negative for chest pain and leg swelling.   Gastrointestinal: Negative for constipation and diarrhea.   Genitourinary: Negative for dysuria and hematuria.   Psychiatric/Behavioral: Negative for dysphoric mood and sleep disturbance.      Otherwise complete ROS reviewed and negative except as mentioned in the HPI.    Past Medical History:   Past Medical History:   Diagnosis Date   • Arthritis    • GERD (gastroesophageal reflux disease)    • Hyperlipidemia    • Hypertension      Past Surgical History:  Past Surgical History:   Procedure Laterality Date   • COLONOSCOPY     • ENDOSCOPIC FUNCTIONAL SINUS SURGERY (FESS) Bilateral 2/6/2020    Procedure: ENDOSCOPIC FUNCTIONAL SINUS SURGERY WITH SEPTOPLASTY AND RESECTION INFERIOR TURBINATES;  Surgeon: Cricket Olguin Jr., MD;  Location: [facility] OR;  Service: ENT;  Laterality: Bilateral;   • RESECTION OF NASAL TURBINATES Bilateral 2/6/2020    Procedure: RESECTION OF NASAL TURBINATES;  Surgeon: Cricket Olguin Jr., MD;  Location: [facility] OR;  Service: ENT;  Laterality: Bilateral;     Social History:  reports that he quit smoking about 24 years ago. His smoking  use included cigarettes. He has a 33.00 pack-year smoking history. He has never used smokeless tobacco. He reports current alcohol use. He reports that he does not use drugs.    Family History: family history includes Cancer in his mother; Tuberculosis in his maternal grandfather.       Allergies:  Allergies   Allergen Reactions   • Sulfa Antibiotics Shortness Of Breath   • Penicillins Rash     Happened 30 yrs + ago       Medications:  Prior to Admission medications    Medication Sig Start Date End Date Taking? Authorizing Provider   aspirin 81 MG chewable tablet Chew 1 tablet Daily. 7/13/20  Yes Elizabeth French DO   atorvastatin (LIPITOR) 20 MG tablet TAKE ONE TABLET BY MOUTH DAILY 11/8/21  Yes Elizabeth French DO   fluticasone (FLONASE) 50 MCG/ACT nasal spray 2 sprays into the nostril(s) as directed by provider 2 (Two) Times a Day. 7/13/20  Yes Elizabeth French DO   ipratropium (ATROVENT) 0.06 % nasal spray 2 sprays into the nostril(s) as directed by provider 4 (Four) Times a Day As Needed for Rhinitis. For drainage 5/25/21  Yes Cricket Olguin Jr., MD   meloxicam (MOBIC) 15 MG tablet TAKE ONE TABLET BY MOUTH DAILY 2/28/22  Yes Elizabeth French DO   metFORMIN (GLUCOPHAGE) 500 MG tablet Take 1 tablet by mouth 2 (Two) Times a Day. Taking 1 daily instead 11/8/21  Yes Elizabeth French DO   omeprazole (priLOSEC) 20 MG capsule Take 1 capsule by mouth Daily. 8/24/21  Yes Elizabeth French DO   Turmeric 500 MG capsule Take  by mouth Daily.   Yes ProviderHao MD   olmesartan (Benicar) 5 MG tablet Take 1 tablet by mouth Daily. 8/2/21   Elizabeth French DO   azithromycin (Zithromax Z-Eugenio) 250 MG tablet Take 2 tablets by mouth on day 1, then 1 tablet daily on days 2-5 1/5/22 3/17/22  Elizabeth French DO   methylPREDNISolone (MEDROL) 4 MG dose pack Take as directed on package instructions. 1/5/22 3/17/22  Elizabeth Frenhc DO       Objective     Vital Signs: /78 (BP Location: Left  "arm, Patient Position: Sitting, Cuff Size: Adult)   Pulse 86   Temp 96.6 °F (35.9 °C) (Infrared)   Ht 182.9 cm (72\")   Wt 93.4 kg (206 lb)   SpO2 99%   BMI 27.94 kg/m²   Physical Exam  Vitals reviewed.   Constitutional:       Appearance: Normal appearance.   HENT:      Head: Normocephalic and atraumatic.      Right Ear: External ear normal.      Left Ear: External ear normal.      Nose: Nose normal.   Eyes:      General: No scleral icterus.     Conjunctiva/sclera: Conjunctivae normal.   Cardiovascular:      Rate and Rhythm: Normal rate and regular rhythm.      Heart sounds: Normal heart sounds.   Pulmonary:      Effort: Pulmonary effort is normal.      Breath sounds: Normal breath sounds.   Musculoskeletal:         General: No swelling or tenderness.      Cervical back: Normal range of motion and neck supple.   Skin:     General: Skin is warm and dry.   Neurological:      General: No focal deficit present.      Mental Status: He is alert.      Cranial Nerves: No cranial nerve deficit.   Psychiatric:         Mood and Affect: Mood normal.         Behavior: Behavior normal.       Patient's Body mass index is 27.94 kg/m². indicating that he is within normal range (BMI 18.5-24.9). No BMI management plan needed..      Results Reviewed:  Glucose   Date Value Ref Range Status   08/03/2021 146 (H) 65 - 99 mg/dL Final   01/30/2020 127 (H) 65 - 99 mg/dL Final     BUN   Date Value Ref Range Status   08/03/2021 19 8 - 27 mg/dL Final   01/30/2020 17 6 - 20 mg/dL Final     Creatinine   Date Value Ref Range Status   08/03/2021 0.90 0.76 - 1.27 mg/dL Final   01/30/2020 0.71 (L) 0.76 - 1.27 mg/dL Final     Sodium   Date Value Ref Range Status   08/03/2021 138 134 - 144 mmol/L Final   01/30/2020 139 136 - 145 mmol/L Final     Potassium   Date Value Ref Range Status   08/03/2021 4.5 3.5 - 5.2 mmol/L Final   01/30/2020 4.1 3.5 - 5.2 mmol/L Final     Chloride   Date Value Ref Range Status   08/03/2021 103 96 - 106 mmol/L Final "   01/30/2020 103 98 - 107 mmol/L Final     CO2   Date Value Ref Range Status   01/30/2020 23.0 22.0 - 29.0 mmol/L Final     Total CO2   Date Value Ref Range Status   08/03/2021 21 20 - 29 mmol/L Final     Calcium   Date Value Ref Range Status   08/03/2021 9.4 8.6 - 10.2 mg/dL Final   01/30/2020 9.2 8.6 - 10.5 mg/dL Final     ALT (SGPT)   Date Value Ref Range Status   08/03/2021 99 (H) 0 - 44 IU/L Final     AST (SGOT)   Date Value Ref Range Status   08/03/2021 89 (H) 0 - 40 IU/L Final     WBC   Date Value Ref Range Status   09/22/2021 4.3 3.4 - 10.8 x10E3/uL Final     Hematocrit   Date Value Ref Range Status   09/22/2021 43.2 37.5 - 51.0 % Final   01/30/2020 41.5 37.5 - 51.0 % Final     Platelets   Date Value Ref Range Status   09/22/2021 141 (L) 150 - 450 x10E3/uL Final   01/30/2020 168 140 - 450 10*3/mm3 Final     Triglycerides   Date Value Ref Range Status   08/03/2021 226 (H) 0 - 149 mg/dL Final     HDL Cholesterol   Date Value Ref Range Status   08/03/2021 38 (L) >39 mg/dL Final     LDL Chol Calc (NIH)   Date Value Ref Range Status   08/03/2021 74 0 - 99 mg/dL Final     Hemoglobin A1C   Date Value Ref Range Status   03/17/2022 6.5 % Final      Assessment / Plan     Assessment/Plan:  1. Type 2 diabetes mellitus without complication, without long-term current use of insulin (HCC)  - POC Glycosylated Hemoglobin (Hb A1C)  - Vitamin D 25 hydroxy  - Currently controlled    2. Essential hypertension  - Lipid panel  - CBC w AUTO Differential  - Comprehensive metabolic panel  - Elevated in the office but states that it is controlled at home.       Return in about 6 months (around 9/17/2022). unless patient needs to be seen sooner or acute issues arise.    Code Status: Full    I have discussed the patient results/orders and and plan/recommendation with them at today's visit.      Elizabeth French, DO   03/17/2022        Answers for HPI/ROS submitted by the patient on 3/16/2022  What is the primary reason for your visit?:  Physical

## 2022-03-18 LAB
25(OH)D3+25(OH)D2 SERPL-MCNC: 23.4 NG/ML (ref 30–100)
ALBUMIN SERPL-MCNC: 4.7 G/DL (ref 3.8–4.8)
ALBUMIN/GLOB SERPL: 1.8 {RATIO} (ref 1.2–2.2)
ALP SERPL-CCNC: 81 IU/L (ref 44–121)
ALT SERPL-CCNC: 101 IU/L (ref 0–44)
AST SERPL-CCNC: 88 IU/L (ref 0–40)
BASOPHILS # BLD AUTO: 0 X10E3/UL (ref 0–0.2)
BASOPHILS NFR BLD AUTO: 1 %
BILIRUB SERPL-MCNC: 0.5 MG/DL (ref 0–1.2)
BUN SERPL-MCNC: 20 MG/DL (ref 8–27)
BUN/CREAT SERPL: 27 (ref 10–24)
CALCIUM SERPL-MCNC: 9.6 MG/DL (ref 8.6–10.2)
CHLORIDE SERPL-SCNC: 101 MMOL/L (ref 96–106)
CHOLEST SERPL-MCNC: 158 MG/DL (ref 100–199)
CO2 SERPL-SCNC: 19 MMOL/L (ref 20–29)
CREAT SERPL-MCNC: 0.74 MG/DL (ref 0.76–1.27)
EGFRCR SERPLBLD CKD-EPI 2021: 103 ML/MIN/1.73
EOSINOPHIL # BLD AUTO: 0.2 X10E3/UL (ref 0–0.4)
EOSINOPHIL NFR BLD AUTO: 3 %
ERYTHROCYTE [DISTWIDTH] IN BLOOD BY AUTOMATED COUNT: 13 % (ref 11.6–15.4)
GLOBULIN SER CALC-MCNC: 2.6 G/DL (ref 1.5–4.5)
GLUCOSE SERPL-MCNC: 140 MG/DL (ref 65–99)
HCT VFR BLD AUTO: 47 % (ref 37.5–51)
HDLC SERPL-MCNC: 33 MG/DL
HGB BLD-MCNC: 16.4 G/DL (ref 13–17.7)
IMM GRANULOCYTES # BLD AUTO: 0 X10E3/UL (ref 0–0.1)
IMM GRANULOCYTES NFR BLD AUTO: 0 %
LDLC SERPL CALC-MCNC: 60 MG/DL (ref 0–99)
LYMPHOCYTES # BLD AUTO: 1.6 X10E3/UL (ref 0.7–3.1)
LYMPHOCYTES NFR BLD AUTO: 33 %
MCH RBC QN AUTO: 32.6 PG (ref 26.6–33)
MCHC RBC AUTO-ENTMCNC: 34.9 G/DL (ref 31.5–35.7)
MCV RBC AUTO: 93 FL (ref 79–97)
MONOCYTES # BLD AUTO: 0.3 X10E3/UL (ref 0.1–0.9)
MONOCYTES NFR BLD AUTO: 7 %
NEUTROPHILS # BLD AUTO: 2.7 X10E3/UL (ref 1.4–7)
NEUTROPHILS NFR BLD AUTO: 56 %
PLATELET # BLD AUTO: 169 X10E3/UL (ref 150–450)
POTASSIUM SERPL-SCNC: 4.6 MMOL/L (ref 3.5–5.2)
PROT SERPL-MCNC: 7.3 G/DL (ref 6–8.5)
RBC # BLD AUTO: 5.03 X10E6/UL (ref 4.14–5.8)
SODIUM SERPL-SCNC: 138 MMOL/L (ref 134–144)
TRIGL SERPL-MCNC: 426 MG/DL (ref 0–149)
VLDLC SERPL CALC-MCNC: 65 MG/DL (ref 5–40)
WBC # BLD AUTO: 4.8 X10E3/UL (ref 3.4–10.8)

## 2022-03-22 ENCOUNTER — TELEPHONE (OUTPATIENT)
Dept: INTERNAL MEDICINE | Facility: CLINIC | Age: 62
End: 2022-03-22

## 2022-03-22 NOTE — TELEPHONE ENCOUNTER
Discussed TG and adding new medication. He does not want to add Tricor at this time. States that he will watch his sweets.

## 2022-05-02 ENCOUNTER — LAB (OUTPATIENT)
Dept: INTERNAL MEDICINE | Facility: CLINIC | Age: 62
End: 2022-05-02
Payer: COMMERCIAL

## 2022-05-02 DIAGNOSIS — R10.84 GENERALIZED ABDOMINAL PAIN: Primary | ICD-10-CM

## 2022-05-03 ENCOUNTER — TELEPHONE (OUTPATIENT)
Dept: INTERNAL MEDICINE | Facility: CLINIC | Age: 62
End: 2022-05-03

## 2022-05-03 ENCOUNTER — OFFICE VISIT (OUTPATIENT)
Dept: INTERNAL MEDICINE | Facility: CLINIC | Age: 62
End: 2022-05-03

## 2022-05-03 VITALS
DIASTOLIC BLOOD PRESSURE: 81 MMHG | TEMPERATURE: 97.7 F | SYSTOLIC BLOOD PRESSURE: 151 MMHG | BODY MASS INDEX: 27.9 KG/M2 | HEART RATE: 88 BPM | WEIGHT: 206 LBS | HEIGHT: 72 IN | OXYGEN SATURATION: 98 %

## 2022-05-03 DIAGNOSIS — R19.7 DIARRHEA, UNSPECIFIED TYPE: Primary | ICD-10-CM

## 2022-05-03 DIAGNOSIS — R11.2 NAUSEA AND VOMITING, UNSPECIFIED VOMITING TYPE: ICD-10-CM

## 2022-05-03 DIAGNOSIS — R10.13 EPIGASTRIC PAIN: ICD-10-CM

## 2022-05-03 LAB
ALBUMIN SERPL-MCNC: 4.1 G/DL (ref 3.8–4.8)
ALBUMIN/GLOB SERPL: 1.9 {RATIO} (ref 1.2–2.2)
ALP SERPL-CCNC: 238 IU/L (ref 44–121)
ALT SERPL-CCNC: 232 IU/L (ref 0–44)
AST SERPL-CCNC: 232 IU/L (ref 0–40)
BASOPHILS # BLD AUTO: 0.1 X10E3/UL (ref 0–0.2)
BASOPHILS NFR BLD AUTO: 1 %
BILIRUB SERPL-MCNC: 0.7 MG/DL (ref 0–1.2)
BUN SERPL-MCNC: 14 MG/DL (ref 8–27)
BUN/CREAT SERPL: 16 (ref 10–24)
CALCIUM SERPL-MCNC: 9.4 MG/DL (ref 8.6–10.2)
CHLORIDE SERPL-SCNC: 106 MMOL/L (ref 96–106)
CHOLEST SERPL-MCNC: 132 MG/DL (ref 100–199)
CO2 SERPL-SCNC: 23 MMOL/L (ref 20–29)
CREAT SERPL-MCNC: 0.85 MG/DL (ref 0.76–1.27)
EGFRCR SERPLBLD CKD-EPI 2021: 99 ML/MIN/1.73
EOSINOPHIL # BLD AUTO: 0.1 X10E3/UL (ref 0–0.4)
EOSINOPHIL NFR BLD AUTO: 2 %
ERYTHROCYTE [DISTWIDTH] IN BLOOD BY AUTOMATED COUNT: 12.6 % (ref 11.6–15.4)
GLOBULIN SER CALC-MCNC: 2.2 G/DL (ref 1.5–4.5)
GLUCOSE SERPL-MCNC: 154 MG/DL (ref 65–99)
H PYLORI IGM SER-ACNC: <9 UNITS (ref 0–8.9)
HCT VFR BLD AUTO: 44.2 % (ref 37.5–51)
HDLC SERPL-MCNC: 33 MG/DL
HGB BLD-MCNC: 15 G/DL (ref 13–17.7)
IMM GRANULOCYTES # BLD AUTO: 0.2 X10E3/UL (ref 0–0.1)
IMM GRANULOCYTES NFR BLD AUTO: 2 %
LDLC SERPL CALC-MCNC: 53 MG/DL (ref 0–99)
LYMPHOCYTES # BLD AUTO: 1.2 X10E3/UL (ref 0.7–3.1)
LYMPHOCYTES NFR BLD AUTO: 13 %
MCH RBC QN AUTO: 32.1 PG (ref 26.6–33)
MCHC RBC AUTO-ENTMCNC: 33.9 G/DL (ref 31.5–35.7)
MCV RBC AUTO: 95 FL (ref 79–97)
MONOCYTES # BLD AUTO: 0.7 X10E3/UL (ref 0.1–0.9)
MONOCYTES NFR BLD AUTO: 7 %
NEUTROPHILS # BLD AUTO: 6.7 X10E3/UL (ref 1.4–7)
NEUTROPHILS NFR BLD AUTO: 75 %
PLATELET # BLD AUTO: 155 X10E3/UL (ref 150–450)
POTASSIUM SERPL-SCNC: 4.2 MMOL/L (ref 3.5–5.2)
PROT SERPL-MCNC: 6.3 G/DL (ref 6–8.5)
RBC # BLD AUTO: 4.67 X10E6/UL (ref 4.14–5.8)
SODIUM SERPL-SCNC: 146 MMOL/L (ref 134–144)
TRIGL SERPL-MCNC: 299 MG/DL (ref 0–149)
VLDLC SERPL CALC-MCNC: 46 MG/DL (ref 5–40)
WBC # BLD AUTO: 8.8 X10E3/UL (ref 3.4–10.8)

## 2022-05-03 PROCEDURE — 99213 OFFICE O/P EST LOW 20 MIN: CPT | Performed by: INTERNAL MEDICINE

## 2022-05-03 RX ORDER — ONDANSETRON 4 MG/1
4 TABLET, ORALLY DISINTEGRATING ORAL EVERY 8 HOURS PRN
Qty: 20 TABLET | Refills: 1 | Status: SHIPPED | OUTPATIENT
Start: 2022-05-03

## 2022-05-03 RX ORDER — SACCHAROMYCES BOULARDII 250 MG
250 CAPSULE ORAL 2 TIMES DAILY
Qty: 30 CAPSULE | Refills: 1 | Status: SHIPPED | OUTPATIENT
Start: 2022-05-03

## 2022-05-03 NOTE — TELEPHONE ENCOUNTER
Brandon came in person today and he came in and stated they are not seeing a precert for him and they are supposed to call this number for the prior authorization [id number].

## 2022-05-03 NOTE — TELEPHONE ENCOUNTER
Yes, I have. I thought between Dr. Bowman and the patient they were waiting for his labs to come back and for medicine to be called in for his issue. If not that, he was going to need an office visit of some sort before I could send the auth to his insurance. I can,t send the auth without documentation of failed treatment or notes explain why he needs it. If I fax it right now without any notes I'm afraid it is going to get denied.

## 2022-05-03 NOTE — PROGRESS NOTES
Subjective     Chief Complaint   Patient presents with   • Abdominal Pain       History of Present Illness  Patient is having abdominal pain.   About 2 weeks ago he left town and went on a trip    woke up the next morning with diarrhea. It did not stop. Saturday night before coming home he got nausea and vomiting. He felt terrible. Monday he felt OK. Wednesday night he was sick all night. But Thursday morning he felt better. He has been loosing weight and stomach has been flat but last night he felt bloated. He felt like he got some bad food.   Went last week and got yogurt and feels like he got straightened out.   He took about 6 TUMS yesterday and only had a little bit of pain. Describes the pain as a discomfort. He was able to get some rest. The pain was in the stomach area and radiated into the center of his back. Had a burger and beans last night and felt pressure but nothing like before. Hurts worse when hungry and when he eats feels better.   On blood with vomiting and stool.     Has had an upper and lower endoscopy in the past by Dr. Rhodes.     Patient's PMR from outside medical facility reviewed and noted.    Review of Systems   Constitutional: Negative for chills and fever.   HENT: Negative for congestion and rhinorrhea.    Respiratory: Negative for cough and shortness of breath.    Cardiovascular: Negative for chest pain and leg swelling.   Gastrointestinal: Positive for anal bleeding, diarrhea, nausea and vomiting. Negative for blood in stool.   Genitourinary: Negative for dysuria and hematuria.      Otherwise complete ROS reviewed and negative except as mentioned in the HPI.    Past Medical History:   Past Medical History:   Diagnosis Date   • Arthritis    • GERD (gastroesophageal reflux disease)    • Hyperlipidemia    • Hypertension      Past Surgical History:  Past Surgical History:   Procedure Laterality Date   • COLONOSCOPY     • ENDOSCOPIC FUNCTIONAL SINUS SURGERY (FESS) Bilateral 2/6/2020     Procedure: ENDOSCOPIC FUNCTIONAL SINUS SURGERY WITH SEPTOPLASTY AND RESECTION INFERIOR TURBINATES;  Surgeon: Cricket Olguin Jr., MD;  Location:  [facility] OR;  Service: ENT;  Laterality: Bilateral;   • RESECTION OF NASAL TURBINATES Bilateral 2/6/2020    Procedure: RESECTION OF NASAL TURBINATES;  Surgeon: Cricket Olguin Jr., MD;  Location:  [facility] OR;  Service: ENT;  Laterality: Bilateral;     Social History:  reports that he quit smoking about 24 years ago. His smoking use included cigarettes. He has a 33.00 pack-year smoking history. He has never used smokeless tobacco. He reports current alcohol use. He reports that he does not use drugs.    Family History: family history includes Cancer in his mother; Tuberculosis in his maternal grandfather.      Allergies:  Allergies   Allergen Reactions   • Sulfa Antibiotics Shortness Of Breath   • Penicillins Rash     Happened 30 yrs + ago       Medications:  Prior to Admission medications    Medication Sig Start Date End Date Taking? Authorizing Provider   atorvastatin (LIPITOR) 20 MG tablet TAKE ONE TABLET BY MOUTH DAILY 11/8/21  Yes Elizabeth French DO   fluticasone (FLONASE) 50 MCG/ACT nasal spray 2 sprays into the nostril(s) as directed by provider 2 (Two) Times a Day. 7/13/20  Yes Elizabeth French DO   meloxicam (MOBIC) 15 MG tablet TAKE ONE TABLET BY MOUTH DAILY 2/28/22  Yes Elizabeth French DO   metFORMIN (GLUCOPHAGE) 500 MG tablet Take 1 tablet by mouth 2 (Two) Times a Day. Taking 1 daily instead 11/8/21  Yes Elizabeth French DO   omeprazole (priLOSEC) 20 MG capsule Take 1 capsule by mouth Daily. 8/24/21  Yes Elizabeth French DO   Turmeric 500 MG capsule Take  by mouth Daily.   Yes Hao Otoole MD   aspirin 81 MG chewable tablet Chew 1 tablet Daily. 7/13/20 5/3/22  Elizabeth French DO   ipratropium (ATROVENT) 0.06 % nasal spray 2 sprays into the nostril(s) as directed by provider 4 (Four) Times a Day As Needed for Rhinitis. For  "drainage 5/25/21 5/3/22  Cricket Olguin Jr., MD       Objective     Vital Signs: /81 (BP Location: Left arm, Patient Position: Sitting, Cuff Size: Adult)   Pulse 88   Temp 97.7 °F (36.5 °C) (Infrared)   Ht 182.9 cm (72\")   Wt 93.4 kg (206 lb)   SpO2 98%   BMI 27.94 kg/m²   Physical Exam  Vitals reviewed.   Constitutional:       Appearance: Normal appearance.   HENT:      Head: Normocephalic and atraumatic.      Right Ear: External ear normal.      Left Ear: External ear normal.      Nose: Nose normal.   Eyes:      General: No scleral icterus.     Conjunctiva/sclera: Conjunctivae normal.   Cardiovascular:      Rate and Rhythm: Normal rate and regular rhythm.      Heart sounds: Normal heart sounds.   Pulmonary:      Effort: Pulmonary effort is normal.      Breath sounds: Normal breath sounds.   Abdominal:      General: Bowel sounds are normal.      Palpations: Abdomen is soft.   Musculoskeletal:         General: No swelling or tenderness.      Cervical back: Normal range of motion and neck supple.   Skin:     General: Skin is warm and dry.   Neurological:      General: No focal deficit present.      Mental Status: He is alert.      Cranial Nerves: No cranial nerve deficit.   Psychiatric:         Mood and Affect: Mood normal.         Behavior: Behavior normal.             Results Reviewed:  Glucose   Date Value Ref Range Status   03/17/2022 140 (H) 65 - 99 mg/dL Final   01/30/2020 127 (H) 65 - 99 mg/dL Final     BUN   Date Value Ref Range Status   03/17/2022 20 8 - 27 mg/dL Final   01/30/2020 17 6 - 20 mg/dL Final     Creatinine   Date Value Ref Range Status   03/17/2022 0.74 (L) 0.76 - 1.27 mg/dL Final   01/30/2020 0.71 (L) 0.76 - 1.27 mg/dL Final     Sodium   Date Value Ref Range Status   03/17/2022 138 134 - 144 mmol/L Final   01/30/2020 139 136 - 145 mmol/L Final     Potassium   Date Value Ref Range Status   03/17/2022 4.6 3.5 - 5.2 mmol/L Final   01/30/2020 4.1 3.5 - 5.2 mmol/L Final "     Chloride   Date Value Ref Range Status   03/17/2022 101 96 - 106 mmol/L Final   01/30/2020 103 98 - 107 mmol/L Final     CO2   Date Value Ref Range Status   01/30/2020 23.0 22.0 - 29.0 mmol/L Final     Total CO2   Date Value Ref Range Status   03/17/2022 19 (L) 20 - 29 mmol/L Final     Calcium   Date Value Ref Range Status   03/17/2022 9.6 8.6 - 10.2 mg/dL Final   01/30/2020 9.2 8.6 - 10.5 mg/dL Final     ALT (SGPT)   Date Value Ref Range Status   03/17/2022 101 (H) 0 - 44 IU/L Final     AST (SGOT)   Date Value Ref Range Status   03/17/2022 88 (H) 0 - 40 IU/L Final     WBC   Date Value Ref Range Status   03/17/2022 4.8 3.4 - 10.8 x10E3/uL Final     Hematocrit   Date Value Ref Range Status   03/17/2022 47.0 37.5 - 51.0 % Final   01/30/2020 41.5 37.5 - 51.0 % Final     Platelets   Date Value Ref Range Status   03/17/2022 169 150 - 450 x10E3/uL Final   01/30/2020 168 140 - 450 10*3/mm3 Final     Triglycerides   Date Value Ref Range Status   03/17/2022 426 (H) 0 - 149 mg/dL Final     HDL Cholesterol   Date Value Ref Range Status   03/17/2022 33 (L) >39 mg/dL Final     LDL Chol Calc (NIH)   Date Value Ref Range Status   03/17/2022 60 0 - 99 mg/dL Final     Hemoglobin A1C   Date Value Ref Range Status   03/17/2022 6.5 % Final     Assessment / Plan     Assessment/Plan:  1. Diarrhea, unspecified type  - saccharomyces boulardii (Florastor) 250 MG capsule; Take 1 capsule by mouth 2 (Two) Times a Day.  Dispense: 30 capsule; Refill: 1    2. Nausea and vomiting, unspecified vomiting type  - ondansetron ODT (Zofran ODT) 4 MG disintegrating tablet; Place 1 tablet on the tongue Every 8 (Eight) Hours As Needed for Nausea or Vomiting.  Dispense: 20 tablet; Refill: 1    3. Epigastric pain  - CT scan of the abdomen pending        Return if symptoms worsen or fail to improve, for Annual physical, Recheck, Next scheduled follow up. unless patient needs to be seen sooner or acute issues arise.    Code Status: Full    I have discussed  the patient results/orders and and plan/recommendation with them at today's visit.      Elizabeth French,    05/03/2022

## 2022-05-06 DIAGNOSIS — R10.84 GENERALIZED ABDOMINAL PAIN: ICD-10-CM

## 2022-05-23 ENCOUNTER — TELEPHONE (OUTPATIENT)
Dept: INTERNAL MEDICINE | Facility: CLINIC | Age: 62
End: 2022-05-23

## 2022-05-23 DIAGNOSIS — I10 ESSENTIAL HYPERTENSION: ICD-10-CM

## 2022-05-23 DIAGNOSIS — R10.84 GENERALIZED ABDOMINAL PAIN: ICD-10-CM

## 2022-05-23 DIAGNOSIS — E11.9 TYPE 2 DIABETES MELLITUS WITHOUT COMPLICATION, WITHOUT LONG-TERM CURRENT USE OF INSULIN: ICD-10-CM

## 2022-05-23 NOTE — TELEPHONE ENCOUNTER
Caller: Chente Campbell    Relationship: Self    Best call back number: [phone]    What orders are you requesting (i.e. lab or imaging): LABS    In what timeframe would the patient need to come in: AS SOON AS POSSIBLE    Where will you receive your lab/imaging services: AT OFFICE    Additional notes: PATIENT WAS TOLD TO COME BACK FOR FOLLOW UP LABS. PATIENT HAD LABS WITH LAST APPOINTMENT AND WAS TOLD TO DO FOLLOW UP LABS TO CHECK STATS      PLEASE CALL PATIENT ONCE LAB ORDERS ARE PLACED

## 2022-05-24 ENCOUNTER — LAB (OUTPATIENT)
Dept: INTERNAL MEDICINE | Facility: CLINIC | Age: 62
End: 2022-05-24
Payer: COMMERCIAL

## 2022-07-06 ENCOUNTER — CLINICAL SUPPORT (OUTPATIENT)
Dept: INTERNAL MEDICINE | Facility: CLINIC | Age: 62
End: 2022-07-06

## 2022-07-06 DIAGNOSIS — L23.7 POISON IVY: Primary | ICD-10-CM

## 2022-07-06 PROCEDURE — 96372 THER/PROPH/DIAG INJ SC/IM: CPT | Performed by: INTERNAL MEDICINE

## 2022-07-06 RX ORDER — DEXAMETHASONE SODIUM PHOSPHATE 10 MG/ML
10 INJECTION INTRAMUSCULAR; INTRAVENOUS ONCE
Status: COMPLETED | OUTPATIENT
Start: 2022-07-06 | End: 2022-07-06

## 2022-07-06 RX ORDER — TRIAMCINOLONE ACETONIDE 40 MG/ML
40 INJECTION, SUSPENSION INTRA-ARTICULAR; INTRAMUSCULAR ONCE
Status: COMPLETED | OUTPATIENT
Start: 2022-07-06 | End: 2022-07-06

## 2022-07-06 RX ADMIN — DEXAMETHASONE SODIUM PHOSPHATE 10 MG: 10 INJECTION INTRAMUSCULAR; INTRAVENOUS at 08:57

## 2022-07-06 RX ADMIN — TRIAMCINOLONE ACETONIDE 40 MG: 40 INJECTION, SUSPENSION INTRA-ARTICULAR; INTRAMUSCULAR at 09:00

## 2022-07-06 NOTE — PROGRESS NOTES
After obtaining consent, and per orders of Dr. French, injection of Dexamethasone & Kenalog given by Kristal Guerin CMA. Patient instructed to remain in clinic for 20 minutes afterwards, and to report any adverse reaction to me immediately.

## 2022-07-16 DIAGNOSIS — K21.9 GASTROESOPHAGEAL REFLUX DISEASE WITHOUT ESOPHAGITIS: ICD-10-CM

## 2022-07-16 DIAGNOSIS — I10 ESSENTIAL HYPERTENSION: ICD-10-CM

## 2022-07-18 RX ORDER — OLMESARTAN MEDOXOMIL 5 MG/1
TABLET ORAL
Qty: 90 TABLET | Refills: 3 | Status: SHIPPED | OUTPATIENT
Start: 2022-07-18

## 2022-07-18 RX ORDER — OMEPRAZOLE 20 MG/1
CAPSULE, DELAYED RELEASE ORAL
Qty: 90 CAPSULE | Refills: 3 | Status: SHIPPED | OUTPATIENT
Start: 2022-07-18

## 2022-11-01 ENCOUNTER — OFFICE VISIT (OUTPATIENT)
Dept: INTERNAL MEDICINE | Facility: CLINIC | Age: 62
End: 2022-11-01

## 2022-11-01 VITALS
TEMPERATURE: 97.8 F | OXYGEN SATURATION: 100 % | HEIGHT: 72 IN | BODY MASS INDEX: 27.77 KG/M2 | SYSTOLIC BLOOD PRESSURE: 157 MMHG | DIASTOLIC BLOOD PRESSURE: 79 MMHG | WEIGHT: 205 LBS | HEART RATE: 82 BPM

## 2022-11-01 DIAGNOSIS — R09.81 NASAL CONGESTION: ICD-10-CM

## 2022-11-01 DIAGNOSIS — I10 PRIMARY HYPERTENSION: ICD-10-CM

## 2022-11-01 DIAGNOSIS — E78.2 ELEVATED TRIGLYCERIDES WITH HIGH CHOLESTEROL: ICD-10-CM

## 2022-11-01 DIAGNOSIS — Z12.11 COLON CANCER SCREENING: ICD-10-CM

## 2022-11-01 DIAGNOSIS — E55.9 VITAMIN D DEFICIENCY: ICD-10-CM

## 2022-11-01 DIAGNOSIS — Z12.5 PROSTATE CANCER SCREENING: ICD-10-CM

## 2022-11-01 DIAGNOSIS — U07.1 COVID-19 VIRUS DETECTED: Primary | ICD-10-CM

## 2022-11-01 DIAGNOSIS — E11.9 TYPE 2 DIABETES MELLITUS WITHOUT COMPLICATION, WITHOUT LONG-TERM CURRENT USE OF INSULIN: ICD-10-CM

## 2022-11-01 PROBLEM — H93.19 TINNITUS: Status: ACTIVE | Noted: 2022-11-01

## 2022-11-01 PROBLEM — F41.9 ANXIETY DISORDER: Status: ACTIVE | Noted: 2022-11-01

## 2022-11-01 PROBLEM — E78.5 HYPERLIPIDEMIA: Status: ACTIVE | Noted: 2022-11-01

## 2022-11-01 PROBLEM — K21.9 GASTROESOPHAGEAL REFLUX DISEASE: Status: ACTIVE | Noted: 2022-11-01

## 2022-11-01 PROCEDURE — 99214 OFFICE O/P EST MOD 30 MIN: CPT | Performed by: INTERNAL MEDICINE

## 2022-11-01 RX ORDER — AZELASTINE 1 MG/ML
2 SPRAY, METERED NASAL 2 TIMES DAILY
Qty: 30 ML | Refills: 12 | Status: SHIPPED | OUTPATIENT
Start: 2022-11-01

## 2022-11-01 NOTE — PROGRESS NOTES
Subjective     Chief Complaint   Patient presents with   • Diabetes   • Hypertension       Hypertension  This is a chronic problem. The current episode started more than 1 year ago. The problem is unchanged. The problem is uncontrolled. Pertinent negatives include no chest pain or shortness of breath. There are no associated agents to hypertension. Risk factors for coronary artery disease include male gender. Current antihypertension treatment includes angiotensin blockers. The current treatment provides mild improvement. There are no compliance problems.      Patient does not check his glucose.  He continues to go to the gym.     Patient's PMR from outside medical facility reviewed and noted.    Review of Systems   Constitutional: Negative for chills and fever.   HENT: Negative for congestion and rhinorrhea.    Respiratory: Negative for cough and shortness of breath.    Cardiovascular: Negative for chest pain and leg swelling.   Gastrointestinal: Negative for blood in stool, constipation and diarrhea.   Genitourinary: Negative for dysuria and hematuria.   Psychiatric/Behavioral: Negative for decreased concentration and sleep disturbance.      Otherwise complete ROS reviewed and negative except as mentioned in the HPI.    Past Medical History:   Past Medical History:   Diagnosis Date   • Arthritis    • GERD (gastroesophageal reflux disease)    • Hyperlipidemia    • Hypertension      Past Surgical History:  Past Surgical History:   Procedure Laterality Date   • COLONOSCOPY     • ENDOSCOPIC FUNCTIONAL SINUS SURGERY (FESS) Bilateral 2/6/2020    Procedure: ENDOSCOPIC FUNCTIONAL SINUS SURGERY WITH SEPTOPLASTY AND RESECTION INFERIOR TURBINATES;  Surgeon: Cricket Olguin Jr., MD;  Location: [facility] OR;  Service: ENT;  Laterality: Bilateral;   • RESECTION OF NASAL TURBINATES Bilateral 2/6/2020    Procedure: RESECTION OF NASAL TURBINATES;  Surgeon: Cricket Olguin Jr., MD;  Location: [facility] OR;  Service:  "ENT;  Laterality: Bilateral;     Social History:  reports that he quit smoking about 24 years ago. His smoking use included cigarettes. He has a 33.00 pack-year smoking history. He has never used smokeless tobacco. He reports current alcohol use. He reports that he does not use drugs.    Family History: family history includes Cancer in his mother; Tuberculosis in his maternal grandfather.       Allergies:  Allergies   Allergen Reactions   • Sulfa Antibiotics Shortness Of Breath   • Penicillins Rash     Happened 30 yrs + ago       Medications:  Prior to Admission medications    Medication Sig Start Date End Date Taking? Authorizing Provider   atorvastatin (LIPITOR) 20 MG tablet TAKE ONE TABLET BY MOUTH DAILY 11/8/21  Yes Elizabeth French DO   fluticasone (FLONASE) 50 MCG/ACT nasal spray 2 sprays into the nostril(s) as directed by provider 2 (Two) Times a Day. 7/13/20  Yes Elizabeth French DO   meloxicam (MOBIC) 15 MG tablet TAKE ONE TABLET BY MOUTH DAILY 2/28/22  Yes Elizabeth French DO   metFORMIN (GLUCOPHAGE) 500 MG tablet Take 1 tablet by mouth 2 (Two) Times a Day. Taking 1 daily instead 11/8/21  Yes Elizabeth French DO   omeprazole (priLOSEC) 20 MG capsule TAKE ONE CAPSULE BY MOUTH DAILY 7/18/22  Yes Elizabeth French DO   ondansetron ODT (Zofran ODT) 4 MG disintegrating tablet Place 1 tablet on the tongue Every 8 (Eight) Hours As Needed for Nausea or Vomiting. 5/3/22  Yes Elizabeth French DO   saccharomyces boulardii (Florastor) 250 MG capsule Take 1 capsule by mouth 2 (Two) Times a Day. 5/3/22  Yes Elizabeth French DO   Turmeric 500 MG capsule Take  by mouth Daily.   Yes Provider, MD Hao   olmesartan (BENICAR) 5 MG tablet TAKE ONE TABLET BY MOUTH DAILY 7/18/22   Elizabeth French DO       Objective     Vital Signs: /79 (BP Location: Left arm, Patient Position: Sitting, Cuff Size: Adult)   Pulse 82   Temp 97.8 °F (36.6 °C) (Infrared)   Ht 182.9 cm (72\")   Wt 93 kg (205 " lb)   SpO2 100%   BMI 27.80 kg/m²   Physical Exam  Vitals reviewed.   Constitutional:       Appearance: Normal appearance.   HENT:      Head: Normocephalic and atraumatic.      Right Ear: External ear normal.      Left Ear: External ear normal.      Nose: Nose normal.   Eyes:      General: No scleral icterus.     Conjunctiva/sclera: Conjunctivae normal.   Cardiovascular:      Rate and Rhythm: Normal rate and regular rhythm.      Heart sounds: Normal heart sounds.   Pulmonary:      Effort: Pulmonary effort is normal.      Breath sounds: Normal breath sounds.   Musculoskeletal:         General: No swelling or tenderness.      Cervical back: Normal range of motion and neck supple.   Skin:     General: Skin is warm and dry.   Neurological:      General: No focal deficit present.      Mental Status: He is alert.      Cranial Nerves: No cranial nerve deficit.   Psychiatric:         Mood and Affect: Mood normal.         Behavior: Behavior normal.       BMI is >= 25 and <30. (Overweight) The following options were offered after discussion;: nutrition counseling/recommendations      Results Reviewed:  Glucose   Date Value Ref Range Status   05/24/2022 146 (H) 65 - 99 mg/dL Final   01/30/2020 127 (H) 65 - 99 mg/dL Final     BUN   Date Value Ref Range Status   05/24/2022 15 8 - 27 mg/dL Final   01/30/2020 17 6 - 20 mg/dL Final     Creatinine   Date Value Ref Range Status   05/24/2022 0.88 0.76 - 1.27 mg/dL Final   01/30/2020 0.71 (L) 0.76 - 1.27 mg/dL Final     Sodium   Date Value Ref Range Status   05/24/2022 138 134 - 144 mmol/L Final   01/30/2020 139 136 - 145 mmol/L Final     Potassium   Date Value Ref Range Status   05/24/2022 4.6 3.5 - 5.2 mmol/L Final   01/30/2020 4.1 3.5 - 5.2 mmol/L Final     Chloride   Date Value Ref Range Status   05/24/2022 101 96 - 106 mmol/L Final   01/30/2020 103 98 - 107 mmol/L Final     CO2   Date Value Ref Range Status   01/30/2020 23.0 22.0 - 29.0 mmol/L Final     Total CO2   Date Value  Ref Range Status   05/24/2022 19 (L) 20 - 29 mmol/L Final     Calcium   Date Value Ref Range Status   05/24/2022 9.3 8.6 - 10.2 mg/dL Final   01/30/2020 9.2 8.6 - 10.5 mg/dL Final     ALT (SGPT)   Date Value Ref Range Status   05/24/2022 57 (H) 0 - 44 IU/L Final     AST (SGOT)   Date Value Ref Range Status   05/24/2022 51 (H) 0 - 40 IU/L Final     WBC   Date Value Ref Range Status   05/24/2022 4.7 3.4 - 10.8 x10E3/uL Final     Hematocrit   Date Value Ref Range Status   05/24/2022 43.4 37.5 - 51.0 % Final   01/30/2020 41.5 37.5 - 51.0 % Final     Platelets   Date Value Ref Range Status   05/24/2022 165 150 - 450 x10E3/uL Final   01/30/2020 168 140 - 450 10*3/mm3 Final     Triglycerides   Date Value Ref Range Status   05/02/2022 299 (H) 0 - 149 mg/dL Final     HDL Cholesterol   Date Value Ref Range Status   05/02/2022 33 (L) >39 mg/dL Final     LDL Chol Calc (NIH)   Date Value Ref Range Status   05/02/2022 53 0 - 99 mg/dL Final     Hemoglobin A1C   Date Value Ref Range Status   03/17/2022 6.5 % Final         Will have annual lab work. Discussed weight management and importance of maintaining a healthy weight. Discussed Vitamin D intake and the importance of adequate vitamin D for both Bone Health and a healthy immune system.  Discussed Daily exercise and the importance of same, in regards to a healthy heart as well as helping to maintain weight and improving mental health.      Assessment / Plan     Assessment/Plan:  1. COVID-19 virus detected  - Covid-19 Antibodies IgM    2. Elevated triglycerides with high cholesterol  - Lipid panel    3. Type 2 diabetes mellitus without complication, without long-term current use of insulin (HCC)  - Hemoglobin A1c    4. Vitamin D deficiency  - Vitamin D 25 hydroxy    5. Prostate cancer screening  - PSA SCREENING    6. Primary hypertension  - CBC w AUTO Differential  - Comprehensive metabolic panel  - T4  - TSH    7. Colon cancer screening  - Cologuard - Stool, Per Rectum;  Future    8. Nasal congestion  - azelastine (ASTELIN) 0.1 % nasal spray; 2 sprays into the nostril(s) as directed by provider 2 (Two) Times a Day. Use in each nostril as directed  Dispense: 30 mL; Refill: 12        Return for Annual physical. unless patient needs to be seen sooner or acute issues arise.    Code Status: full    I have discussed the patient results/orders and and plan/recommendation with them at today's visit.      Elizabeth French,    11/01/2022

## 2022-11-03 LAB
25(OH)D3+25(OH)D2 SERPL-MCNC: 37.9 NG/ML (ref 30–100)
ALBUMIN SERPL-MCNC: 4.6 G/DL (ref 3.8–4.8)
ALBUMIN/GLOB SERPL: 2.2 {RATIO} (ref 1.2–2.2)
ALP SERPL-CCNC: 66 IU/L (ref 44–121)
ALT SERPL-CCNC: 58 IU/L (ref 0–44)
AST SERPL-CCNC: 41 IU/L (ref 0–40)
BASOPHILS # BLD AUTO: 0.1 X10E3/UL (ref 0–0.2)
BASOPHILS NFR BLD AUTO: 2 %
BILIRUB SERPL-MCNC: 0.5 MG/DL (ref 0–1.2)
BUN SERPL-MCNC: 15 MG/DL (ref 8–27)
BUN/CREAT SERPL: 19 (ref 10–24)
CALCIUM SERPL-MCNC: 9.9 MG/DL (ref 8.6–10.2)
CHLORIDE SERPL-SCNC: 105 MMOL/L (ref 96–106)
CHOLEST SERPL-MCNC: 149 MG/DL (ref 100–199)
CO2 SERPL-SCNC: 24 MMOL/L (ref 20–29)
CREAT SERPL-MCNC: 0.78 MG/DL (ref 0.76–1.27)
EGFRCR SERPLBLD CKD-EPI 2021: 101 ML/MIN/1.73
EOSINOPHIL # BLD AUTO: 0.1 X10E3/UL (ref 0–0.4)
EOSINOPHIL NFR BLD AUTO: 3 %
ERYTHROCYTE [DISTWIDTH] IN BLOOD BY AUTOMATED COUNT: 12.2 % (ref 11.6–15.4)
GLOBULIN SER CALC-MCNC: 2.1 G/DL (ref 1.5–4.5)
GLUCOSE SERPL-MCNC: 127 MG/DL (ref 70–99)
HBA1C MFR BLD: 6 % (ref 4.8–5.6)
HCT VFR BLD AUTO: 45.2 % (ref 37.5–51)
HDLC SERPL-MCNC: 36 MG/DL
HGB BLD-MCNC: 15.8 G/DL (ref 13–17.7)
IMM GRANULOCYTES # BLD AUTO: 0 X10E3/UL (ref 0–0.1)
IMM GRANULOCYTES NFR BLD AUTO: 0 %
LABORATORY COMMENT REPORT: NORMAL
LDLC SERPL CALC-MCNC: 67 MG/DL (ref 0–99)
LYMPHOCYTES # BLD AUTO: 1.5 X10E3/UL (ref 0.7–3.1)
LYMPHOCYTES NFR BLD AUTO: 31 %
Lab: NORMAL
MCH RBC QN AUTO: 33.1 PG (ref 26.6–33)
MCHC RBC AUTO-ENTMCNC: 35 G/DL (ref 31.5–35.7)
MCV RBC AUTO: 95 FL (ref 79–97)
MONOCYTES # BLD AUTO: 0.4 X10E3/UL (ref 0.1–0.9)
MONOCYTES NFR BLD AUTO: 7 %
NEUTROPHILS # BLD AUTO: 2.8 X10E3/UL (ref 1.4–7)
NEUTROPHILS NFR BLD AUTO: 57 %
PATHOLOGIST NAME: NORMAL
PLATELET # BLD AUTO: 156 X10E3/UL (ref 150–450)
POTASSIUM SERPL-SCNC: 4.3 MMOL/L (ref 3.5–5.2)
PROT SERPL-MCNC: 6.7 G/DL (ref 6–8.5)
PSA SERPL-MCNC: 1.4 NG/ML (ref 0–4)
RBC # BLD AUTO: 4.78 X10E6/UL (ref 4.14–5.8)
SARS-COV-2 IGM SERPL IA-ACNC: 0.04 COI
SARS-COV-2 IGM SERPL QL IA: NEGATIVE
SODIUM SERPL-SCNC: 141 MMOL/L (ref 134–144)
T4 SERPL-MCNC: 6.9 UG/DL (ref 4.5–12)
TRIGL SERPL-MCNC: 291 MG/DL (ref 0–149)
TSH SERPL DL<=0.005 MIU/L-ACNC: 2.56 UIU/ML (ref 0.45–4.5)
VLDLC SERPL CALC-MCNC: 46 MG/DL (ref 5–40)
WBC # BLD AUTO: 4.8 X10E3/UL (ref 3.4–10.8)

## 2022-11-05 DIAGNOSIS — E11.9 TYPE 2 DIABETES MELLITUS WITHOUT COMPLICATION, WITHOUT LONG-TERM CURRENT USE OF INSULIN: ICD-10-CM

## 2022-11-05 DIAGNOSIS — E78.2 ELEVATED TRIGLYCERIDES WITH HIGH CHOLESTEROL: ICD-10-CM

## 2022-11-05 RX ORDER — ATORVASTATIN CALCIUM 20 MG/1
20 TABLET, FILM COATED ORAL DAILY
Qty: 90 TABLET | Refills: 3 | Status: SHIPPED | OUTPATIENT
Start: 2022-11-05

## 2023-01-06 DIAGNOSIS — M79.641 RIGHT HAND PAIN: Primary | ICD-10-CM

## 2023-02-15 DIAGNOSIS — M54.41 CHRONIC MIDLINE LOW BACK PAIN WITH RIGHT-SIDED SCIATICA: ICD-10-CM

## 2023-02-15 DIAGNOSIS — G89.29 CHRONIC MIDLINE LOW BACK PAIN WITH RIGHT-SIDED SCIATICA: ICD-10-CM

## 2023-02-15 RX ORDER — MELOXICAM 15 MG/1
TABLET ORAL
Qty: 90 TABLET | Refills: 3 | Status: SHIPPED | OUTPATIENT
Start: 2023-02-15

## 2023-02-15 NOTE — TELEPHONE ENCOUNTER
Rx Refill Note  Requested Prescriptions     Pending Prescriptions Disp Refills   • meloxicam (MOBIC) 15 MG tablet [Pharmacy Med Name: meloxicam 15 mg tablet] 90 tablet 3     Sig: TAKE ONE TABLET BY MOUTH DAILY      Last office visit with prescribing clinician: 11/1/2022   Next office visit with prescribing clinician: 5/1/2023                         Would you like a call back once the refill request has been completed: [] Yes [] No    If the office needs to give you a call back, can they leave a voicemail: [] Yes [] No    Hetal Hancock RN  02/15/23, 08:06 CST

## 2023-05-01 ENCOUNTER — OFFICE VISIT (OUTPATIENT)
Dept: INTERNAL MEDICINE | Facility: CLINIC | Age: 63
End: 2023-05-01
Payer: COMMERCIAL

## 2023-05-01 VITALS
HEART RATE: 80 BPM | SYSTOLIC BLOOD PRESSURE: 145 MMHG | TEMPERATURE: 97.7 F | DIASTOLIC BLOOD PRESSURE: 83 MMHG | OXYGEN SATURATION: 99 % | WEIGHT: 203 LBS | BODY MASS INDEX: 27.5 KG/M2 | HEIGHT: 72 IN

## 2023-05-01 DIAGNOSIS — I10 ESSENTIAL HYPERTENSION: ICD-10-CM

## 2023-05-01 DIAGNOSIS — I10 PRIMARY HYPERTENSION: ICD-10-CM

## 2023-05-01 DIAGNOSIS — E78.2 ELEVATED TRIGLYCERIDES WITH HIGH CHOLESTEROL: ICD-10-CM

## 2023-05-01 DIAGNOSIS — K21.9 GASTROESOPHAGEAL REFLUX DISEASE WITHOUT ESOPHAGITIS: ICD-10-CM

## 2023-05-01 DIAGNOSIS — E11.9 TYPE 2 DIABETES MELLITUS WITHOUT COMPLICATION, WITHOUT LONG-TERM CURRENT USE OF INSULIN: Primary | ICD-10-CM

## 2023-05-01 RX ORDER — OLMESARTAN MEDOXOMIL 5 MG/1
5 TABLET ORAL DAILY
Qty: 90 TABLET | Refills: 3 | Status: SHIPPED | OUTPATIENT
Start: 2023-05-01

## 2023-05-01 RX ORDER — OMEPRAZOLE 20 MG/1
20 CAPSULE, DELAYED RELEASE ORAL DAILY
Qty: 90 CAPSULE | Refills: 3 | Status: SHIPPED | OUTPATIENT
Start: 2023-05-01

## 2023-05-01 NOTE — PROGRESS NOTES
Subjective     Chief Complaint   Patient presents with   • Annual Exam       History of Present Illness  Patient is going to the gym 3 days a week. Playing golf.   Staying active and watching his diet.   Mild right wrist pain but improving.     Ongoing inus issues. He does not want to go to ENT at this time. He is using nasal sprays regularly.     Patient's PMR from outside medical facility reviewed and noted.    Review of Systems   Constitutional: Negative for chills and fever.   HENT: Positive for congestion. Negative for rhinorrhea.    Respiratory: Negative for cough and shortness of breath.    Cardiovascular: Negative for chest pain and leg swelling.   Gastrointestinal: Negative for blood in stool, constipation and diarrhea.   Genitourinary: Negative for dysuria and frequency.   Skin: Negative for color change and rash.   Psychiatric/Behavioral: Negative for dysphoric mood and sleep disturbance.      Otherwise complete ROS reviewed and negative except as mentioned in the HPI.    Past Medical History:   Past Medical History:   Diagnosis Date   • Arthritis    • GERD (gastroesophageal reflux disease)    • Hyperlipidemia    • Hypertension      Past Surgical History:  Past Surgical History:   Procedure Laterality Date   • COLONOSCOPY     • ENDOSCOPIC FUNCTIONAL SINUS SURGERY (FESS) Bilateral 2/6/2020    Procedure: ENDOSCOPIC FUNCTIONAL SINUS SURGERY WITH SEPTOPLASTY AND RESECTION INFERIOR TURBINATES;  Surgeon: Cricket Olguin Jr., MD;  Location: [facility] OR;  Service: ENT;  Laterality: Bilateral;   • RESECTION OF NASAL TURBINATES Bilateral 2/6/2020    Procedure: RESECTION OF NASAL TURBINATES;  Surgeon: Cricket Olguin Jr., MD;  Location: [facility] OR;  Service: ENT;  Laterality: Bilateral;     Social History:  reports that he quit smoking about 25 years ago. His smoking use included cigarettes. He has a 33.00 pack-year smoking history. He has never used smokeless tobacco. He reports current alcohol  "use. He reports that he does not use drugs.    Family History: family history includes Cancer in his mother; Tuberculosis in his maternal grandfather.       Allergies:  Allergies   Allergen Reactions   • Sulfa Antibiotics Shortness Of Breath   • Penicillins Rash     Happened 30 yrs + ago       Medications:  Prior to Admission medications    Medication Sig Start Date End Date Taking? Authorizing Provider   atorvastatin (LIPITOR) 20 MG tablet Take 1 tablet by mouth Daily. 11/5/22   Elizabeth French DO   azelastine (ASTELIN) 0.1 % nasal spray 2 sprays into the nostril(s) as directed by provider 2 (Two) Times a Day. Use in each nostril as directed 11/1/22   Elizabeth French DO   fluticasone (FLONASE) 50 MCG/ACT nasal spray 2 sprays into the nostril(s) as directed by provider 2 (Two) Times a Day. 7/13/20   Elizabeth French DO   meloxicam (MOBIC) 15 MG tablet TAKE ONE TABLET BY MOUTH DAILY 2/15/23   Elizabeth French DO   metFORMIN (GLUCOPHAGE) 500 MG tablet Take 1 tablet by mouth 2 (Two) Times a Day. Taking 1 daily instead 11/5/22   Elizabeth French DO   olmesartan (BENICAR) 5 MG tablet TAKE ONE TABLET BY MOUTH DAILY 7/18/22   Elizabeth French DO   omeprazole (priLOSEC) 20 MG capsule TAKE ONE CAPSULE BY MOUTH DAILY 7/18/22   Elizabeth French DO   ondansetron ODT (Zofran ODT) 4 MG disintegrating tablet Place 1 tablet on the tongue Every 8 (Eight) Hours As Needed for Nausea or Vomiting. 5/3/22   Elizabeth French DO   saccharomyces boulardii (Florastor) 250 MG capsule Take 1 capsule by mouth 2 (Two) Times a Day. 5/3/22   Elizabeth French DO   Turmeric 500 MG capsule Take  by mouth Daily.    Provider, MD Hao       Objective     Vital Signs: /83 (BP Location: Left arm, Patient Position: Sitting, Cuff Size: Adult)   Pulse 80   Temp 97.7 °F (36.5 °C) (Infrared)   Ht 182.9 cm (72\")   Wt 92.1 kg (203 lb)   SpO2 99%   BMI 27.53 kg/m²   Physical Exam  Vitals reviewed.   Constitutional:  "      Appearance: Normal appearance.   HENT:      Head: Normocephalic and atraumatic.      Right Ear: External ear normal.      Left Ear: External ear normal.      Nose: Nose normal.   Eyes:      General: No scleral icterus.     Conjunctiva/sclera: Conjunctivae normal.   Cardiovascular:      Rate and Rhythm: Normal rate and regular rhythm.      Heart sounds: Normal heart sounds.   Pulmonary:      Effort: Pulmonary effort is normal.      Breath sounds: Normal breath sounds.   Musculoskeletal:         General: No swelling or tenderness.      Cervical back: Normal range of motion and neck supple.   Skin:     General: Skin is warm and dry.   Neurological:      Mental Status: He is alert and oriented to person, place, and time.      Cranial Nerves: No cranial nerve deficit.   Psychiatric:         Behavior: Behavior normal.         BMI is >= 25 and <30. (Overweight) The following options were offered after discussion;: nutrition counseling/recommendations      Results Reviewed:  Glucose   Date Value Ref Range Status   11/01/2022 127 (H) 70 - 99 mg/dL Final   01/30/2020 127 (H) 65 - 99 mg/dL Final     BUN   Date Value Ref Range Status   11/01/2022 15 8 - 27 mg/dL Final   01/30/2020 17 6 - 20 mg/dL Final     Creatinine   Date Value Ref Range Status   11/01/2022 0.78 0.76 - 1.27 mg/dL Final   01/30/2020 0.71 (L) 0.76 - 1.27 mg/dL Final     Sodium   Date Value Ref Range Status   11/01/2022 141 134 - 144 mmol/L Final   01/30/2020 139 136 - 145 mmol/L Final     Potassium   Date Value Ref Range Status   11/01/2022 4.3 3.5 - 5.2 mmol/L Final   01/30/2020 4.1 3.5 - 5.2 mmol/L Final     Chloride   Date Value Ref Range Status   11/01/2022 105 96 - 106 mmol/L Final   01/30/2020 103 98 - 107 mmol/L Final     CO2   Date Value Ref Range Status   01/30/2020 23.0 22.0 - 29.0 mmol/L Final     Total CO2   Date Value Ref Range Status   11/01/2022 24 20 - 29 mmol/L Final     Calcium   Date Value Ref Range Status   11/01/2022 9.9 8.6 - 10.2  mg/dL Final   01/30/2020 9.2 8.6 - 10.5 mg/dL Final     ALT (SGPT)   Date Value Ref Range Status   11/01/2022 58 (H) 0 - 44 IU/L Final     AST (SGOT)   Date Value Ref Range Status   11/01/2022 41 (H) 0 - 40 IU/L Final     WBC   Date Value Ref Range Status   11/01/2022 4.8 3.4 - 10.8 x10E3/uL Final     Hematocrit   Date Value Ref Range Status   11/01/2022 45.2 37.5 - 51.0 % Final   01/30/2020 41.5 37.5 - 51.0 % Final     Platelets   Date Value Ref Range Status   11/01/2022 156 150 - 450 x10E3/uL Final   01/30/2020 168 140 - 450 10*3/mm3 Final     Triglycerides   Date Value Ref Range Status   11/01/2022 291 (H) 0 - 149 mg/dL Final     HDL Cholesterol   Date Value Ref Range Status   11/01/2022 36 (L) >39 mg/dL Final     LDL Chol Calc (NIH)   Date Value Ref Range Status   11/01/2022 67 0 - 99 mg/dL Final     Hemoglobin A1C   Date Value Ref Range Status   11/01/2022 6.0 (H) 4.8 - 5.6 % Final     Comment:              Prediabetes: 5.7 - 6.4           Diabetes: >6.4           Glycemic control for adults with diabetes: <7.0     03/17/2022 6.5 % Final       Assessment / Plan     Assessment/Plan:  1. Type 2 diabetes mellitus without complication, without long-term current use of insulin   -Prior A1c was 6.0  - Hemoglobin A1c    2. Elevated triglycerides with high cholesterol  - TGs elevated previously 291  - Lipid panel    3. Primary hypertension  - BP elevated. Continue current therapy.   - CBC w AUTO Differential  - Comprehensive metabolic panel  - Benicar refilled       Return in about 6 months (around 11/1/2023) for Recheck, Next scheduled follow up. unless patient needs to be seen sooner or acute issues arise.    Code Status: Full    I have discussed the patient results/orders and and plan/recommendation with them at today's visit.      Elizabeth French, DO   05/01/2023

## 2023-05-02 LAB
ALBUMIN SERPL-MCNC: 4.6 G/DL (ref 3.8–4.8)
ALBUMIN/GLOB SERPL: 2 {RATIO} (ref 1.2–2.2)
ALP SERPL-CCNC: 82 IU/L (ref 44–121)
ALT SERPL-CCNC: 78 IU/L (ref 0–44)
AST SERPL-CCNC: 55 IU/L (ref 0–40)
BASOPHILS # BLD AUTO: 0.1 X10E3/UL (ref 0–0.2)
BASOPHILS NFR BLD AUTO: 1 %
BILIRUB SERPL-MCNC: 0.4 MG/DL (ref 0–1.2)
BUN SERPL-MCNC: 14 MG/DL (ref 8–27)
BUN/CREAT SERPL: 18 (ref 10–24)
CALCIUM SERPL-MCNC: 9.2 MG/DL (ref 8.6–10.2)
CHLORIDE SERPL-SCNC: 105 MMOL/L (ref 96–106)
CHOLEST SERPL-MCNC: 144 MG/DL (ref 100–199)
CO2 SERPL-SCNC: 22 MMOL/L (ref 20–29)
CREAT SERPL-MCNC: 0.76 MG/DL (ref 0.76–1.27)
EGFRCR SERPLBLD CKD-EPI 2021: 102 ML/MIN/1.73
EOSINOPHIL # BLD AUTO: 0.1 X10E3/UL (ref 0–0.4)
EOSINOPHIL NFR BLD AUTO: 2 %
ERYTHROCYTE [DISTWIDTH] IN BLOOD BY AUTOMATED COUNT: 12.5 % (ref 11.6–15.4)
GLOBULIN SER CALC-MCNC: 2.3 G/DL (ref 1.5–4.5)
GLUCOSE SERPL-MCNC: 120 MG/DL (ref 70–99)
HBA1C MFR BLD: 6.3 % (ref 4.8–5.6)
HCT VFR BLD AUTO: 43.4 % (ref 37.5–51)
HDLC SERPL-MCNC: 33 MG/DL
HGB BLD-MCNC: 14.9 G/DL (ref 13–17.7)
IMM GRANULOCYTES # BLD AUTO: 0 X10E3/UL (ref 0–0.1)
IMM GRANULOCYTES NFR BLD AUTO: 0 %
LDLC SERPL CALC-MCNC: 58 MG/DL (ref 0–99)
LYMPHOCYTES # BLD AUTO: 1.8 X10E3/UL (ref 0.7–3.1)
LYMPHOCYTES NFR BLD AUTO: 29 %
MCH RBC QN AUTO: 32.3 PG (ref 26.6–33)
MCHC RBC AUTO-ENTMCNC: 34.3 G/DL (ref 31.5–35.7)
MCV RBC AUTO: 94 FL (ref 79–97)
MONOCYTES # BLD AUTO: 0.4 X10E3/UL (ref 0.1–0.9)
MONOCYTES NFR BLD AUTO: 7 %
NEUTROPHILS # BLD AUTO: 3.8 X10E3/UL (ref 1.4–7)
NEUTROPHILS NFR BLD AUTO: 61 %
PLATELET # BLD AUTO: 145 X10E3/UL (ref 150–450)
POTASSIUM SERPL-SCNC: 4.4 MMOL/L (ref 3.5–5.2)
PROT SERPL-MCNC: 6.9 G/DL (ref 6–8.5)
RBC # BLD AUTO: 4.62 X10E6/UL (ref 4.14–5.8)
SODIUM SERPL-SCNC: 143 MMOL/L (ref 134–144)
TRIGL SERPL-MCNC: 341 MG/DL (ref 0–149)
VLDLC SERPL CALC-MCNC: 53 MG/DL (ref 5–40)
WBC # BLD AUTO: 6.2 X10E3/UL (ref 3.4–10.8)

## 2023-09-05 DIAGNOSIS — E78.2 ELEVATED TRIGLYCERIDES WITH HIGH CHOLESTEROL: ICD-10-CM

## 2023-09-05 DIAGNOSIS — E11.9 TYPE 2 DIABETES MELLITUS WITHOUT COMPLICATION, WITHOUT LONG-TERM CURRENT USE OF INSULIN: ICD-10-CM

## 2023-09-05 RX ORDER — ATORVASTATIN CALCIUM 20 MG/1
TABLET, FILM COATED ORAL
Qty: 90 TABLET | Refills: 0 | Status: SHIPPED | OUTPATIENT
Start: 2023-09-05

## 2023-09-05 NOTE — TELEPHONE ENCOUNTER
Patient is establishing care with you on 11/06/23.    Rx Refill Note  Requested Prescriptions     Pending Prescriptions Disp Refills    atorvastatin (LIPITOR) 20 MG tablet [Pharmacy Med Name: atorvastatin 20 mg tablet] 90 tablet 3     Sig: TAKE ONE TABLET BY MOUTH DAILY    metFORMIN (GLUCOPHAGE) 500 MG tablet [Pharmacy Med Name: metformin 500 mg tablet] 180 tablet 3     Sig: TAKE ONE TABLET BY MOUTH TWICE DAILY      Last office visit with prescribing clinician: 5/1/2023   Next office visit with prescribing clinician: 11/06/23                        Would you like a call back once the refill request has been completed: [] Yes [] No    If the office needs to give you a call back, can they leave a voicemail: [] Yes [] No    Hetal Hancock RN  09/05/23, 10:47 CDT

## 2023-11-08 ENCOUNTER — OFFICE VISIT (OUTPATIENT)
Dept: INTERNAL MEDICINE | Facility: CLINIC | Age: 63
End: 2023-11-08
Payer: COMMERCIAL

## 2023-11-08 VITALS
TEMPERATURE: 98 F | HEART RATE: 85 BPM | OXYGEN SATURATION: 97 % | BODY MASS INDEX: 27.22 KG/M2 | WEIGHT: 201 LBS | HEIGHT: 72 IN | DIASTOLIC BLOOD PRESSURE: 81 MMHG | SYSTOLIC BLOOD PRESSURE: 154 MMHG

## 2023-11-08 DIAGNOSIS — J34.89 NASAL DRAINAGE: ICD-10-CM

## 2023-11-08 DIAGNOSIS — K21.9 GASTROESOPHAGEAL REFLUX DISEASE WITHOUT ESOPHAGITIS: ICD-10-CM

## 2023-11-08 DIAGNOSIS — I10 ESSENTIAL HYPERTENSION: ICD-10-CM

## 2023-11-08 DIAGNOSIS — G89.29 CHRONIC MIDLINE LOW BACK PAIN WITH RIGHT-SIDED SCIATICA: ICD-10-CM

## 2023-11-08 DIAGNOSIS — Z00.00 WELLNESS EXAMINATION: ICD-10-CM

## 2023-11-08 DIAGNOSIS — E78.2 ELEVATED TRIGLYCERIDES WITH HIGH CHOLESTEROL: ICD-10-CM

## 2023-11-08 DIAGNOSIS — Z11.59 ENCOUNTER FOR HEPATITIS C SCREENING TEST FOR LOW RISK PATIENT: Primary | ICD-10-CM

## 2023-11-08 DIAGNOSIS — E11.9 TYPE 2 DIABETES MELLITUS WITHOUT COMPLICATION, WITHOUT LONG-TERM CURRENT USE OF INSULIN: ICD-10-CM

## 2023-11-08 DIAGNOSIS — M54.41 CHRONIC MIDLINE LOW BACK PAIN WITH RIGHT-SIDED SCIATICA: ICD-10-CM

## 2023-11-08 RX ORDER — OLMESARTAN MEDOXOMIL 5 MG/1
5 TABLET ORAL DAILY
Qty: 90 TABLET | Refills: 3 | Status: SHIPPED | OUTPATIENT
Start: 2023-11-08

## 2023-11-08 RX ORDER — OMEPRAZOLE 20 MG/1
20 CAPSULE, DELAYED RELEASE ORAL DAILY
Qty: 90 CAPSULE | Refills: 3 | Status: SHIPPED | OUTPATIENT
Start: 2023-11-08

## 2023-11-08 RX ORDER — FLUTICASONE PROPIONATE 50 MCG
2 SPRAY, SUSPENSION (ML) NASAL 2 TIMES DAILY
Qty: 48 G | Refills: 11 | Status: SHIPPED | OUTPATIENT
Start: 2023-11-08

## 2023-11-08 RX ORDER — ATORVASTATIN CALCIUM 20 MG/1
20 TABLET, FILM COATED ORAL DAILY
Qty: 90 TABLET | Refills: 3 | Status: SHIPPED | OUTPATIENT
Start: 2023-11-08

## 2023-11-08 RX ORDER — FENOFIBRATE 145 MG/1
145 TABLET, COATED ORAL DAILY
Qty: 30 TABLET | Refills: 11 | Status: SHIPPED | OUTPATIENT
Start: 2023-11-08

## 2023-11-08 RX ORDER — MELOXICAM 15 MG/1
15 TABLET ORAL DAILY
Qty: 90 TABLET | Refills: 3 | Status: SHIPPED | OUTPATIENT
Start: 2023-11-08

## 2023-11-08 NOTE — PROGRESS NOTES
Chief Complaint  Diabetes    Subjective        Chente Campbell presents to [facility]  Diabetes        Chente Campbell is a 63 y.o. male who presents for a routine wellness visit at this time. Patient presents for follow up of diabetes.. Current symptoms include: none. Patient denies hyperglycemia, increased appetite, paresthesia of the feet, and visual disturbances. Evaluation to date has been: hemoglobin A1C. Home sugars: patient does not check sugars. Current treatments: no recent interventions.  Patient's blood pressure is elevated today patient previously had been on blood pressure medicine but had stopped it on his own I advised him that the Benicar that he was taking was also kidney protective and a adjunct him to his diabetes medicine he is going to restart taking it at this time patient cholesterol remains moderately elevated but triglycerides are significantly more elevated than the others go ahead and start him on some fenofibrate to help with this at this time.    Patient reports that his osteoarthritis remains well controlled and he is happy with his current dose of medication states that his allergies likewise continue to do well with his Flonase nasal spray.  Patient would like to go ahead and do hepatitis C screening with his current blood work.  Patient states that his reflux remains very well controlled on current dose of medication.  Patient declines flu vaccination and pneumococcal vaccination today.  We discussed his abdominal hernia and patient does not wish to do repair at this time.    Diabetic foot exam:   Left: Filament test present   Pulses Dorsalis Pedis:  present  Posterior Tibial:  present   Reflexes 2+    Vibratory sensation normal   Proprioception normal   Sharp/dull discrimination normal       Right: Filament test present   Pulses Dorsalis Pedis:  present  Posterior Tibial:  present   Reflexes 2+    Vibratory sensation normal   Proprioception  "normal   Sharp/dull discrimination normal     Objective   Vital Signs:  /81 (BP Location: Left arm, Patient Position: Sitting, Cuff Size: Adult)   Pulse 85   Temp 98 °F (36.7 °C) (Infrared)   Ht 182.9 cm (72\")   Wt 91.2 kg (201 lb)   SpO2 97%   BMI 27.26 kg/m²   Estimated body mass index is 27.26 kg/m² as calculated from the following:    Height as of this encounter: 182.9 cm (72\").    Weight as of this encounter: 91.2 kg (201 lb).       Past Medical History:   Diagnosis Date    Arthritis     GERD (gastroesophageal reflux disease)     Hyperlipidemia     Hypertension        Past Surgical History:   Procedure Laterality Date    COLONOSCOPY      ENDOSCOPIC FUNCTIONAL SINUS SURGERY (FESS) Bilateral 2020    Procedure: ENDOSCOPIC FUNCTIONAL SINUS SURGERY WITH SEPTOPLASTY AND RESECTION INFERIOR TURBINATES;  Surgeon: Cricket Olguin Jr., MD;  Location: [facility] OR;  Service: ENT;  Laterality: Bilateral;    RESECTION OF NASAL TURBINATES Bilateral 2020    Procedure: RESECTION OF NASAL TURBINATES;  Surgeon: Cricket Olguin Jr., MD;  Location: [facility] OR;  Service: ENT;  Laterality: Bilateral;       Social History     Tobacco Use    Smoking status: Former     Packs/day: 1.50     Years: 22.00     Additional pack years: 0.00     Total pack years: 33.00     Types: Cigarettes     Quit date: 1998     Years since quittin.8    Smokeless tobacco: Never   Vaping Use    Vaping Use: Never used   Substance Use Topics    Alcohol use: Yes     Comment: liquor mixed drink regularly    Drug use: No       Family History   Problem Relation Age of Onset    Cancer Mother         mesothelioma    Tuberculosis Maternal Grandfather        Allergies as of 2023 - Reviewed 2023   Allergen Reaction Noted    Sulfa antibiotics Shortness Of Breath 10/14/2019    Penicillins Rash 10/14/2019         Current Outpatient Medications:     atorvastatin (LIPITOR) 20 MG tablet, Take 1 tablet by mouth Daily., Disp: 90 " tablet, Rfl: 3    fluticasone (FLONASE) 50 MCG/ACT nasal spray, 2 sprays into the nostril(s) as directed by provider 2 (Two) Times a Day., Disp: 48 g, Rfl: 11    meloxicam (MOBIC) 15 MG tablet, Take 1 tablet by mouth Daily., Disp: 90 tablet, Rfl: 3    metFORMIN (GLUCOPHAGE) 500 MG tablet, Take 1 tablet by mouth 2 (Two) Times a Day., Disp: 180 tablet, Rfl: 3    olmesartan (BENICAR) 5 MG tablet, Take 1 tablet by mouth Daily., Disp: 90 tablet, Rfl: 3    omeprazole (priLOSEC) 20 MG capsule, Take 1 capsule by mouth Daily., Disp: 90 capsule, Rfl: 3    saccharomyces boulardii (Florastor) 250 MG capsule, Take 1 capsule by mouth 2 (Two) Times a Day., Disp: 30 capsule, Rfl: 1    Turmeric 500 MG capsule, Take  by mouth Daily., Disp: , Rfl:     fenofibrate (Tricor) 145 MG tablet, Take 1 tablet by mouth Daily., Disp: 30 tablet, Rfl: 11          Physical Exam  Vitals and nursing note reviewed.   Constitutional:       General: He is not in acute distress.     Appearance: Normal appearance.   HENT:      Head: Normocephalic.   Eyes:      Extraocular Movements: Extraocular movements intact.      Pupils: Pupils are equal, round, and reactive to light.   Cardiovascular:      Rate and Rhythm: Normal rate and regular rhythm.      Heart sounds: Normal heart sounds. No murmur heard.  Pulmonary:      Effort: Pulmonary effort is normal. No respiratory distress.      Breath sounds: Normal breath sounds. No rhonchi or rales.   Abdominal:      General: Abdomen is flat. Bowel sounds are normal.      Palpations: Abdomen is soft.      Hernia: A hernia is present.   Neurological:      General: No focal deficit present.      Mental Status: He is alert.        Result Review :                   Assessment and Plan   Diagnoses and all orders for this visit:    1. Encounter for hepatitis C screening test for low risk patient (Primary)  -     Hepatitis C Antibody; Future    2. Wellness examination    3. Elevated triglycerides with high cholesterol  -      atorvastatin (LIPITOR) 20 MG tablet; Take 1 tablet by mouth Daily.  Dispense: 90 tablet; Refill: 3  -     fenofibrate (Tricor) 145 MG tablet; Take 1 tablet by mouth Daily.  Dispense: 30 tablet; Refill: 11    4. Nasal drainage  -     fluticasone (FLONASE) 50 MCG/ACT nasal spray; 2 sprays into the nostril(s) as directed by provider 2 (Two) Times a Day.  Dispense: 48 g; Refill: 11    5. Chronic midline low back pain with right-sided sciatica  -     meloxicam (MOBIC) 15 MG tablet; Take 1 tablet by mouth Daily.  Dispense: 90 tablet; Refill: 3    6. Type 2 diabetes mellitus without complication, without long-term current use of insulin  -     metFORMIN (GLUCOPHAGE) 500 MG tablet; Take 1 tablet by mouth 2 (Two) Times a Day.  Dispense: 180 tablet; Refill: 3  -     Microalbumin / Creatinine Urine Ratio - Urine, Clean Catch  -     Lipid Panel  -     Comprehensive Metabolic Panel  -     Hemoglobin A1c    7. Essential hypertension  -     olmesartan (BENICAR) 5 MG tablet; Take 1 tablet by mouth Daily.  Dispense: 90 tablet; Refill: 3    8. Gastroesophageal reflux disease without esophagitis  -     omeprazole (priLOSEC) 20 MG capsule; Take 1 capsule by mouth Daily.  Dispense: 90 capsule; Refill: 3        EMR Dictation/Transcription disclaimer:   Some of this note may be an electronic transcription/translation of spoken language to printed text. The electronic translation of spoken language may permit erroneous, or at times, nonsensical words or phrases to be inadvertently transcribed; Although I have reviewed the note for such errors, some may still exist.           Follow Up   Return in about 6 months (around 5/8/2024).  Patient was given instructions and counseling regarding his condition or for health maintenance advice. Please see specific information pulled into the AVS if appropriate.

## 2023-11-09 LAB
ALBUMIN SERPL-MCNC: 4.7 G/DL (ref 3.9–4.9)
ALBUMIN/CREAT UR: 11 MG/G CREAT (ref 0–29)
ALBUMIN/GLOB SERPL: 2 {RATIO} (ref 1.2–2.2)
ALP SERPL-CCNC: 77 IU/L (ref 44–121)
ALT SERPL-CCNC: 70 IU/L (ref 0–44)
AST SERPL-CCNC: 53 IU/L (ref 0–40)
BILIRUB SERPL-MCNC: 0.5 MG/DL (ref 0–1.2)
BUN SERPL-MCNC: 17 MG/DL (ref 8–27)
BUN/CREAT SERPL: 22 (ref 10–24)
CALCIUM SERPL-MCNC: 9.3 MG/DL (ref 8.6–10.2)
CHLORIDE SERPL-SCNC: 103 MMOL/L (ref 96–106)
CHOLEST SERPL-MCNC: 144 MG/DL (ref 100–199)
CO2 SERPL-SCNC: 20 MMOL/L (ref 20–29)
CREAT SERPL-MCNC: 0.78 MG/DL (ref 0.76–1.27)
CREAT UR-MCNC: 81 MG/DL
EGFRCR SERPLBLD CKD-EPI 2021: 100 ML/MIN/1.73
GLOBULIN SER CALC-MCNC: 2.3 G/DL (ref 1.5–4.5)
GLUCOSE SERPL-MCNC: 124 MG/DL (ref 70–99)
HBA1C MFR BLD: 6.3 % (ref 4.8–5.6)
HDLC SERPL-MCNC: 36 MG/DL
LDLC SERPL CALC-MCNC: 55 MG/DL (ref 0–99)
MICROALBUMIN UR-MCNC: 8.7 UG/ML
POTASSIUM SERPL-SCNC: 4.3 MMOL/L (ref 3.5–5.2)
PROT SERPL-MCNC: 7 G/DL (ref 6–8.5)
SODIUM SERPL-SCNC: 141 MMOL/L (ref 134–144)
TRIGL SERPL-MCNC: 348 MG/DL (ref 0–149)
VLDLC SERPL CALC-MCNC: 53 MG/DL (ref 5–40)

## 2023-11-14 LAB
HCV IGG SERPL QL IA: NON REACTIVE
WRITTEN AUTHORIZATION: NORMAL

## 2023-11-30 DIAGNOSIS — E78.2 ELEVATED TRIGLYCERIDES WITH HIGH CHOLESTEROL: ICD-10-CM

## 2023-11-30 RX ORDER — FENOFIBRATE 145 MG/1
145 TABLET, COATED ORAL DAILY
Qty: 30 TABLET | Refills: 11 | Status: SHIPPED | OUTPATIENT
Start: 2023-11-30

## 2023-11-30 NOTE — TELEPHONE ENCOUNTER
"    Caller: Chente Campbell Brandon \"Brandon\"    Relationship: Self    Best call back number: [phone]     Requested Prescriptions:   Requested Prescriptions     Pending Prescriptions Disp Refills    fenofibrate (Tricor) 145 MG tablet 30 tablet 11     Sig: Take 1 tablet by mouth Daily.        Pharmacy where request should be sent:  [facility] [address] [phone] [facility] [phone]      Last office visit with prescribing clinician: 11/8/2023   Last telemedicine visit with prescribing clinician: Visit date not found   Next office visit with prescribing clinician: Visit date not found     Additional details provided by patient: PATIENT STATES MEDICATION IS WORKING WELL AND WOULD LIKE SENT AS A 90 DAY SUPPLY     Does the patient have less than a 3 day supply:  [] Yes  [x] No    Would you like a call back once the refill request has been completed: [] Yes [x] No        Sera Onofre, Rhina Rep   11/30/23 09:35 CST         "

## 2023-12-02 DIAGNOSIS — M54.41 CHRONIC MIDLINE LOW BACK PAIN WITH RIGHT-SIDED SCIATICA: ICD-10-CM

## 2023-12-02 DIAGNOSIS — G89.29 CHRONIC MIDLINE LOW BACK PAIN WITH RIGHT-SIDED SCIATICA: ICD-10-CM

## 2023-12-04 RX ORDER — MELOXICAM 15 MG/1
15 TABLET ORAL DAILY
Qty: 90 TABLET | Refills: 3 | Status: SHIPPED | OUTPATIENT
Start: 2023-12-04

## 2024-01-02 ENCOUNTER — TELEPHONE (OUTPATIENT)
Dept: INTERNAL MEDICINE | Facility: CLINIC | Age: 64
End: 2024-01-02

## 2024-01-02 DIAGNOSIS — Z91.09 ENVIRONMENTAL ALLERGIES: Primary | ICD-10-CM

## 2024-01-02 NOTE — TELEPHONE ENCOUNTER
"Caller: Chente Campbell Brandon \"Brandon\"    Relationship: Self    Best call back number: [phone]    What is the medical concern/diagnosis: STUFFY NOSE, CAUSING     What specialty or service is being requested: ALLERGIST    DOES NOT KNOW OF ANY LOCATIONS, WOULD BE OK WITH PROVIDERS SUGGESTION.  "

## 2024-03-01 DIAGNOSIS — I10 ESSENTIAL HYPERTENSION: ICD-10-CM

## 2024-03-01 RX ORDER — OLMESARTAN MEDOXOMIL 5 MG/1
5 TABLET ORAL DAILY
Qty: 90 TABLET | Refills: 3 | Status: SHIPPED | OUTPATIENT
Start: 2024-03-01

## 2024-03-01 NOTE — TELEPHONE ENCOUNTER
Rx Refill Note  Requested Prescriptions     Pending Prescriptions Disp Refills    olmesartan (BENICAR) 5 MG tablet [Pharmacy Med Name: olmesartan 5 mg tablet] 90 tablet 3     Sig: TAKE ONE TABLET BY MOUTH EVERY DAY      Last office visit with prescribing clinician: 5/1/2023   Last telemedicine visit with prescribing clinician: Visit date not found   Next office visit with prescribing clinician: Visit date not found                         Would you like a call back once the refill request has been completed: [] Yes [] No    If the office needs to give you a call back, can they leave a voicemail: [] Yes [] No    Hetal Hancock RN  03/01/24, 08:07 CST

## 2024-05-06 DIAGNOSIS — E11.9 TYPE 2 DIABETES MELLITUS WITHOUT COMPLICATION, WITHOUT LONG-TERM CURRENT USE OF INSULIN: ICD-10-CM

## 2024-05-06 DIAGNOSIS — Z12.5 SCREENING PSA (PROSTATE SPECIFIC ANTIGEN): Primary | ICD-10-CM

## 2024-05-08 ENCOUNTER — LAB (OUTPATIENT)
Dept: INTERNAL MEDICINE | Facility: CLINIC | Age: 64
End: 2024-05-08
Payer: COMMERCIAL

## 2024-05-08 DIAGNOSIS — Z12.5 SCREENING PSA (PROSTATE SPECIFIC ANTIGEN): ICD-10-CM

## 2024-05-08 DIAGNOSIS — E11.9 TYPE 2 DIABETES MELLITUS WITHOUT COMPLICATION, WITHOUT LONG-TERM CURRENT USE OF INSULIN: ICD-10-CM

## 2024-05-09 LAB
ALBUMIN SERPL-MCNC: 4.4 G/DL (ref 3.9–4.9)
ALBUMIN/GLOB SERPL: 2 {RATIO} (ref 1.2–2.2)
ALP SERPL-CCNC: 78 IU/L (ref 44–121)
ALT SERPL-CCNC: 82 IU/L (ref 0–44)
AST SERPL-CCNC: 85 IU/L (ref 0–40)
BILIRUB SERPL-MCNC: 0.3 MG/DL (ref 0–1.2)
BUN SERPL-MCNC: 17 MG/DL (ref 8–27)
BUN/CREAT SERPL: 20 (ref 10–24)
CALCIUM SERPL-MCNC: 9 MG/DL (ref 8.6–10.2)
CHLORIDE SERPL-SCNC: 106 MMOL/L (ref 96–106)
CO2 SERPL-SCNC: 20 MMOL/L (ref 20–29)
CREAT SERPL-MCNC: 0.87 MG/DL (ref 0.76–1.27)
EGFRCR SERPLBLD CKD-EPI 2021: 97 ML/MIN/1.73
GLOBULIN SER CALC-MCNC: 2.2 G/DL (ref 1.5–4.5)
GLUCOSE SERPL-MCNC: 175 MG/DL (ref 70–99)
HBA1C MFR BLD: 7.3 % (ref 4.8–5.6)
POTASSIUM SERPL-SCNC: 4.3 MMOL/L (ref 3.5–5.2)
PROT SERPL-MCNC: 6.6 G/DL (ref 6–8.5)
PSA SERPL-MCNC: 1.4 NG/ML (ref 0–4)
SODIUM SERPL-SCNC: 140 MMOL/L (ref 134–144)

## 2024-05-25 DIAGNOSIS — K21.9 GASTROESOPHAGEAL REFLUX DISEASE WITHOUT ESOPHAGITIS: ICD-10-CM

## 2024-05-28 RX ORDER — OMEPRAZOLE 20 MG/1
20 CAPSULE, DELAYED RELEASE ORAL DAILY
Qty: 90 CAPSULE | Refills: 3 | Status: SHIPPED | OUTPATIENT
Start: 2024-05-28

## 2024-08-26 DIAGNOSIS — E78.2 ELEVATED TRIGLYCERIDES WITH HIGH CHOLESTEROL: ICD-10-CM

## 2024-08-26 RX ORDER — FENOFIBRATE 145 MG/1
145 TABLET, COATED ORAL DAILY
Qty: 90 TABLET | Refills: 3 | Status: SHIPPED | OUTPATIENT
Start: 2024-08-26

## 2024-11-20 DIAGNOSIS — G89.29 CHRONIC MIDLINE LOW BACK PAIN WITH RIGHT-SIDED SCIATICA: ICD-10-CM

## 2024-11-20 DIAGNOSIS — E11.9 TYPE 2 DIABETES MELLITUS WITHOUT COMPLICATION, WITHOUT LONG-TERM CURRENT USE OF INSULIN: ICD-10-CM

## 2024-11-20 DIAGNOSIS — J34.89 NASAL DRAINAGE: ICD-10-CM

## 2024-11-20 DIAGNOSIS — E78.2 ELEVATED TRIGLYCERIDES WITH HIGH CHOLESTEROL: ICD-10-CM

## 2024-11-20 DIAGNOSIS — I10 ESSENTIAL HYPERTENSION: ICD-10-CM

## 2024-11-20 DIAGNOSIS — M54.41 CHRONIC MIDLINE LOW BACK PAIN WITH RIGHT-SIDED SCIATICA: ICD-10-CM

## 2024-11-20 RX ORDER — FLUTICASONE PROPIONATE 50 MCG
2 SPRAY, SUSPENSION (ML) NASAL 2 TIMES DAILY
Qty: 48 G | Refills: 11 | Status: SHIPPED | OUTPATIENT
Start: 2024-11-20

## 2024-11-20 RX ORDER — OLMESARTAN MEDOXOMIL 5 MG/1
5 TABLET ORAL DAILY
Qty: 90 TABLET | Refills: 3 | Status: SHIPPED | OUTPATIENT
Start: 2024-11-20

## 2024-11-20 RX ORDER — ATORVASTATIN CALCIUM 20 MG/1
20 TABLET, FILM COATED ORAL DAILY
Qty: 90 TABLET | Refills: 3 | Status: SHIPPED | OUTPATIENT
Start: 2024-11-20

## 2024-11-20 RX ORDER — MELOXICAM 15 MG/1
15 TABLET ORAL DAILY
Qty: 90 TABLET | Refills: 3 | Status: SHIPPED | OUTPATIENT
Start: 2024-11-20

## 2025-04-14 ENCOUNTER — LAB (OUTPATIENT)
Dept: INTERNAL MEDICINE | Facility: CLINIC | Age: 65
End: 2025-04-14
Payer: COMMERCIAL

## 2025-04-14 DIAGNOSIS — Z12.5 SCREENING PSA (PROSTATE SPECIFIC ANTIGEN): ICD-10-CM

## 2025-04-14 DIAGNOSIS — E11.9 TYPE 2 DIABETES MELLITUS WITHOUT COMPLICATION, WITHOUT LONG-TERM CURRENT USE OF INSULIN: ICD-10-CM

## 2025-04-14 DIAGNOSIS — Z12.5 SCREENING PSA (PROSTATE SPECIFIC ANTIGEN): Primary | ICD-10-CM

## 2025-04-15 LAB
ALBUMIN SERPL-MCNC: 4.4 G/DL (ref 3.9–4.9)
ALBUMIN/CREAT UR: 7 MG/G CREAT (ref 0–29)
ALP SERPL-CCNC: 86 IU/L (ref 44–121)
ALT SERPL-CCNC: 67 IU/L (ref 0–44)
AST SERPL-CCNC: 63 IU/L (ref 0–40)
BILIRUB SERPL-MCNC: 0.5 MG/DL (ref 0–1.2)
BUN SERPL-MCNC: 22 MG/DL (ref 8–27)
BUN/CREAT SERPL: 22 (ref 10–24)
CALCIUM SERPL-MCNC: 9.6 MG/DL (ref 8.6–10.2)
CHLORIDE SERPL-SCNC: 103 MMOL/L (ref 96–106)
CHOLEST SERPL-MCNC: 138 MG/DL (ref 100–199)
CO2 SERPL-SCNC: 18 MMOL/L (ref 20–29)
CREAT SERPL-MCNC: 0.98 MG/DL (ref 0.76–1.27)
CREAT UR-MCNC: 90.3 MG/DL
EGFRCR SERPLBLD CKD-EPI 2021: 86 ML/MIN/1.73
GLOBULIN SER CALC-MCNC: 2.5 G/DL (ref 1.5–4.5)
GLUCOSE SERPL-MCNC: 154 MG/DL (ref 70–99)
HBA1C MFR BLD: 8.2 % (ref 4.8–5.6)
HDLC SERPL-MCNC: 28 MG/DL
LDLC SERPL CALC-MCNC: 74 MG/DL (ref 0–99)
MICROALBUMIN UR-MCNC: 5.9 UG/ML
POTASSIUM SERPL-SCNC: 4.7 MMOL/L (ref 3.5–5.2)
PROT SERPL-MCNC: 6.9 G/DL (ref 6–8.5)
PSA SERPL-MCNC: 1.7 NG/ML (ref 0–4)
SODIUM SERPL-SCNC: 138 MMOL/L (ref 134–144)
TRIGL SERPL-MCNC: 217 MG/DL (ref 0–149)
TSH SERPL DL<=0.005 MIU/L-ACNC: 2.84 UIU/ML (ref 0.45–4.5)
VLDLC SERPL CALC-MCNC: 36 MG/DL (ref 5–40)

## 2025-04-17 ENCOUNTER — OFFICE VISIT (OUTPATIENT)
Dept: INTERNAL MEDICINE | Facility: CLINIC | Age: 65
End: 2025-04-17
Payer: COMMERCIAL

## 2025-04-17 VITALS
OXYGEN SATURATION: 98 % | BODY MASS INDEX: 27.09 KG/M2 | WEIGHT: 200 LBS | HEIGHT: 72 IN | DIASTOLIC BLOOD PRESSURE: 83 MMHG | HEART RATE: 76 BPM | TEMPERATURE: 98 F | SYSTOLIC BLOOD PRESSURE: 159 MMHG

## 2025-04-17 DIAGNOSIS — E11.9 TYPE 2 DIABETES MELLITUS WITHOUT COMPLICATION, WITHOUT LONG-TERM CURRENT USE OF INSULIN: ICD-10-CM

## 2025-04-17 DIAGNOSIS — F32.A DEPRESSION, UNSPECIFIED DEPRESSION TYPE: ICD-10-CM

## 2025-04-17 DIAGNOSIS — K21.9 GASTROESOPHAGEAL REFLUX DISEASE WITHOUT ESOPHAGITIS: ICD-10-CM

## 2025-04-17 DIAGNOSIS — R74.8 ELEVATED LIVER ENZYMES: Primary | ICD-10-CM

## 2025-04-17 DIAGNOSIS — R68.82 LOSS OF LIBIDO: ICD-10-CM

## 2025-04-17 RX ORDER — OMEPRAZOLE 20 MG/1
20 CAPSULE, DELAYED RELEASE ORAL DAILY
Qty: 90 CAPSULE | Refills: 3 | Status: SHIPPED | OUTPATIENT
Start: 2025-04-17

## 2025-04-17 RX ORDER — BUPROPION HYDROCHLORIDE 150 MG/1
150 TABLET ORAL DAILY
Qty: 30 TABLET | Refills: 11 | Status: SHIPPED | OUTPATIENT
Start: 2025-04-17

## 2025-04-17 NOTE — PROGRESS NOTES
Subjective     Chief Complaint   Patient presents with    Annual Exam       History of Present Illness    Patient's PMR from outside medical facility reviewed and noted.    Chente Campbell is a 64 y.o. male who presents for an annual physical.  I discussed at preventative health care and cancer screening with him and its role in reducing types of cancer and other health problems appropriate for the patient's age and gender.  Also discussed vaccine status with the patient with current age related guidelines.  Patient understands the risks and benefits of preventative screening and immunization.  he has chosen to go ahead and obtain Annual wellness exam, CBC, CMP, TSH, Hba1c, Lipid profile, and Hep C screening at this time.    Patient presents for follow up of Diabetes type 2. Current symptoms include: none. Patient denies polyuria, visual disturbances, and vomiting.  Home sugars: patient does not check sugars. Current treatments:  Patient's blood sugars have been moderately elevated more so than they have been in the past.  Patient is going to try diet before we do any further interventions we will go ahead and get him set up for 4 months at this time . Patient is due for his diabetic labs at this time, so we will go ahead and order Cmp, HBa1c, Lipid Profile, and Microalbumin urine for evaluation.     Diabetic foot exam:   Left: Filament test present   Pulses Dorsalis Pedis:  present  Posterior Tibial:  present   Reflexes 2+    Vibratory sensation normal   Proprioception normal   Sharp/dull discrimination normal       Right: Filament test present   Pulses Dorsalis Pedis:  present  Posterior Tibial:  present   Reflexes 2+    Vibratory sensation normal   Proprioception normal   Sharp/dull discrimination normal         Past Medical History:   Past Medical History:   Diagnosis Date    Arthritis     GERD (gastroesophageal reflux disease)     Hyperlipidemia     Hypertension      Past Surgical History:  Past  Surgical History:   Procedure Laterality Date    COLONOSCOPY      ENDOSCOPIC FUNCTIONAL SINUS SURGERY (FESS) Bilateral 2/6/2020    Procedure: ENDOSCOPIC FUNCTIONAL SINUS SURGERY WITH SEPTOPLASTY AND RESECTION INFERIOR TURBINATES;  Surgeon: Cricket Olguin Jr., MD;  Location: [facility];  Service: ENT;  Laterality: Bilateral;    RESECTION OF NASAL TURBINATES Bilateral 2/6/2020    Procedure: RESECTION OF NASAL TURBINATES;  Surgeon: Cricket Olguin Jr., MD;  Location: [facility] OR;  Service: ENT;  Laterality: Bilateral;     Social History:  reports that he quit smoking about 27 years ago. His smoking use included cigarettes. He started smoking about 49 years ago. He has a 33 pack-year smoking history. He has never used smokeless tobacco. He reports current alcohol use. He reports that he does not use drugs.    Family History: family history includes Cancer in his mother; Tuberculosis in his maternal grandfather.      Allergies:  Allergies   Allergen Reactions    Sulfa Antibiotics Shortness Of Breath    Penicillins Rash     Happened 30 yrs + ago       Medications:  Prior to Admission medications    Medication Sig Start Date End Date Taking? Authorizing Provider   atorvastatin (LIPITOR) 20 MG tablet Take 1 tablet by mouth Daily. 11/20/24  Yes Andres Petersen MD   fenofibrate (Tricor) 145 MG tablet Take 1 tablet by mouth Daily. 8/26/24  Yes Andres Pteersen MD   fluticasone (FLONASE) 50 MCG/ACT nasal spray Administer 2 sprays into the nostril(s) as directed by provider 2 (Two) Times a Day. 11/20/24  Yes Andres Petersen MD   meloxicam (MOBIC) 15 MG tablet Take 1 tablet by mouth Daily. 11/20/24  Yes Andres Petersen MD   metFORMIN (GLUCOPHAGE) 500 MG tablet Take 1 tablet by mouth 2 (Two) Times a Day. 11/20/24  Yes Andres Petersen MD   olmesartan (BENICAR) 5 MG tablet Take 1 tablet by mouth Daily. 11/20/24  Yes Andres Petersen MD   omeprazole (priLOSEC) 20 MG  "capsule TAKE ONE CAPSULE BY MOUTH EVERY DAY 5/28/24  Yes Andres Petersen MD   saccharomyces boulardii (Florastor) 250 MG capsule Take 1 capsule by mouth 2 (Two) Times a Day. 5/3/22  Yes Elizabeth French, DO   Turmeric 500 MG capsule Take  by mouth Daily.  Patient not taking: Reported on 4/17/2025    Provider, MD Hao       MARY: Over the last two weeks, how often have you been bothered by the following problems?  Feeling nervous, anxious or on edge: Not at all  Not being able to stop or control worrying: Not at all  Worrying too much about different things: Not at all  Trouble Relaxing: More than half the days  Being so restless that it is hard to sit still: Several days  Becoming easily annoyed or irritable: More than half the days  Feeling afraid as if something awful might happen: Several days  MARY 7 Total Score: 6  If you checked any problems, how difficult have these problems made it for you to do your work, take care of things at home, or get along with other people: Not difficult at all    PHQ:  Little interest or pleasure in doing things? Not at all   Feeling down, depressed, or hopeless? Not at all   PHQ-2 Total Score 0         0 (Negative screening for depression)  Support given, observe for worsening symptoms        Review of systems   negative unless otherwise specified above in HPI    Objective     Vital Signs: /83 (BP Location: Left arm, Patient Position: Sitting, Cuff Size: Adult) Comment: white coat,  BP is normal at home,  he will send me [employer] message Monday with some readings  Pulse 76   Temp 98 °F (36.7 °C) (Infrared)   Ht 182.9 cm (72\")   Wt 90.7 kg (200 lb)   SpO2 98%   BMI 27.12 kg/m²     Physical Exam    BMI is >= 25 and <30. (Overweight) The following options were offered after discussion;: weight loss educational material (shared in after visit summary)      Results Reviewed:  Glucose   Date Value Ref Range Status   04/14/2025 154 (H) 70 - 99 mg/dL Final "   01/30/2020 127 (H) 65 - 99 mg/dL Final     BUN   Date Value Ref Range Status   04/14/2025 22 8 - 27 mg/dL Final   01/30/2020 17 6 - 20 mg/dL Final     Creatinine   Date Value Ref Range Status   04/14/2025 0.98 0.76 - 1.27 mg/dL Final   01/30/2020 0.71 (L) 0.76 - 1.27 mg/dL Final     Sodium   Date Value Ref Range Status   04/14/2025 138 134 - 144 mmol/L Final   01/30/2020 139 136 - 145 mmol/L Final     Potassium   Date Value Ref Range Status   04/14/2025 4.7 3.5 - 5.2 mmol/L Final   01/30/2020 4.1 3.5 - 5.2 mmol/L Final     Chloride   Date Value Ref Range Status   04/14/2025 103 96 - 106 mmol/L Final   01/30/2020 103 98 - 107 mmol/L Final     CO2   Date Value Ref Range Status   01/30/2020 23.0 22.0 - 29.0 mmol/L Final     Total CO2   Date Value Ref Range Status   04/14/2025 18 (L) 20 - 29 mmol/L Final     Calcium   Date Value Ref Range Status   04/14/2025 9.6 8.6 - 10.2 mg/dL Final   01/30/2020 9.2 8.6 - 10.5 mg/dL Final     ALT (SGPT)   Date Value Ref Range Status   04/14/2025 67 (H) 0 - 44 IU/L Final     AST (SGOT)   Date Value Ref Range Status   04/14/2025 63 (H) 0 - 40 IU/L Final     WBC   Date Value Ref Range Status   05/01/2023 6.2 3.4 - 10.8 x10E3/uL Final     Hematocrit   Date Value Ref Range Status   05/01/2023 43.4 37.5 - 51.0 % Final   01/30/2020 41.5 37.5 - 51.0 % Final     Platelets   Date Value Ref Range Status   05/01/2023 145 (L) 150 - 450 x10E3/uL Final   01/30/2020 168 140 - 450 10*3/mm3 Final     Triglycerides   Date Value Ref Range Status   04/14/2025 217 (H) 0 - 149 mg/dL Final     HDL Cholesterol   Date Value Ref Range Status   04/14/2025 28 (L) >39 mg/dL Final     LDL Chol Calc (NIH)   Date Value Ref Range Status   04/14/2025 74 0 - 99 mg/dL Final     Hemoglobin A1C   Date Value Ref Range Status   04/14/2025 8.2 (H) 4.8 - 5.6 % Final     Comment:              Prediabetes: 5.7 - 6.4           Diabetes: >6.4           Glycemic control for adults with diabetes: <7.0     03/17/2022 6.5 % Final              Procedure   Procedures       Assessment / Plan     Assessment/Plan:   Diagnosis Plan   1. Elevated liver enzymes  US Liver      2. Gastroesophageal reflux disease without esophagitis  omeprazole (priLOSEC) 20 MG capsule      3. Depression, unspecified depression type  buPROPion XL (Wellbutrin XL) 150 MG 24 hr tablet      4. Loss of libido  Testosterone      5. Type 2 diabetes mellitus without complication, without long-term current use of insulin  Microalbumin / Creatinine Urine Ratio - Urine, Clean Catch    Lipid Panel    Comprehensive Metabolic Panel    Hemoglobin A1c            Return in about 4 months (around 8/17/2025). unless patient needs to be seen sooner or acute issues arise.      I have discussed the patient results/orders and and plan/recommendation with them at today's visit.      Signed by:    Andres Petersen MD Date: 04/17/25

## 2025-04-21 ENCOUNTER — TELEPHONE (OUTPATIENT)
Dept: INTERNAL MEDICINE | Facility: CLINIC | Age: 65
End: 2025-04-21
Payer: COMMERCIAL

## 2025-07-19 ENCOUNTER — DOCUMENTATION (OUTPATIENT)
Dept: CARDIOLOGY | Facility: CLINIC | Age: 65
End: 2025-07-19
Payer: COMMERCIAL

## 2025-07-19 RX ORDER — LISINOPRIL 2.5 MG/1
2.5 TABLET ORAL DAILY
Qty: 90 TABLET | Refills: 3 | Status: CANCELLED | OUTPATIENT
Start: 2025-07-19

## 2025-08-04 ENCOUNTER — LAB (OUTPATIENT)
Dept: INTERNAL MEDICINE | Facility: CLINIC | Age: 65
End: 2025-08-04
Payer: COMMERCIAL

## 2025-08-04 DIAGNOSIS — R68.82 LOSS OF LIBIDO: ICD-10-CM

## 2025-08-04 DIAGNOSIS — E11.9 TYPE 2 DIABETES MELLITUS WITHOUT COMPLICATION, WITHOUT LONG-TERM CURRENT USE OF INSULIN: ICD-10-CM

## 2025-08-05 LAB
ALBUMIN SERPL-MCNC: 4.3 G/DL (ref 3.9–4.9)
ALBUMIN/CREAT UR: 4 MG/G CREAT (ref 0–29)
ALP SERPL-CCNC: 80 IU/L (ref 44–121)
ALT SERPL-CCNC: 53 IU/L (ref 0–44)
AST SERPL-CCNC: 43 IU/L (ref 0–40)
BILIRUB SERPL-MCNC: 0.2 MG/DL (ref 0–1.2)
BUN SERPL-MCNC: 21 MG/DL (ref 8–27)
BUN/CREAT SERPL: 18 (ref 10–24)
CALCIUM SERPL-MCNC: 9.9 MG/DL (ref 8.6–10.2)
CHLORIDE SERPL-SCNC: 103 MMOL/L (ref 96–106)
CHOLEST SERPL-MCNC: 160 MG/DL (ref 100–199)
CO2 SERPL-SCNC: 18 MMOL/L (ref 20–29)
CREAT SERPL-MCNC: 1.16 MG/DL (ref 0.76–1.27)
CREAT UR-MCNC: 76.2 MG/DL
EGFRCR SERPLBLD CKD-EPI 2021: 70 ML/MIN/1.73
GLOBULIN SER CALC-MCNC: 2.5 G/DL (ref 1.5–4.5)
GLUCOSE SERPL-MCNC: 161 MG/DL (ref 70–99)
HBA1C MFR BLD: 6.9 % (ref 4.8–5.6)
HDLC SERPL-MCNC: 27 MG/DL
LDLC SERPL CALC-MCNC: 68 MG/DL (ref 0–99)
MICROALBUMIN UR-MCNC: 3.1 UG/ML
POTASSIUM SERPL-SCNC: 4.4 MMOL/L (ref 3.5–5.2)
PROT SERPL-MCNC: 6.8 G/DL (ref 6–8.5)
SODIUM SERPL-SCNC: 138 MMOL/L (ref 134–144)
TESTOST SERPL-MCNC: 746 NG/DL (ref 264–916)
TRIGL SERPL-MCNC: 411 MG/DL (ref 0–149)
VLDLC SERPL CALC-MCNC: 65 MG/DL (ref 5–40)

## 2025-08-07 ENCOUNTER — PRIOR AUTHORIZATION (OUTPATIENT)
Dept: INTERNAL MEDICINE | Facility: CLINIC | Age: 65
End: 2025-08-07

## 2025-08-12 DIAGNOSIS — E78.2 ELEVATED TRIGLYCERIDES WITH HIGH CHOLESTEROL: ICD-10-CM

## 2025-08-12 RX ORDER — FENOFIBRATE 145 MG/1
145 TABLET, FILM COATED ORAL DAILY
Qty: 90 TABLET | Refills: 3 | Status: SHIPPED | OUTPATIENT
Start: 2025-08-12

## 2025-08-19 ENCOUNTER — TELEPHONE (OUTPATIENT)
Dept: INTERNAL MEDICINE | Facility: CLINIC | Age: 65
End: 2025-08-19
Payer: COMMERCIAL

## (undated) DEVICE — TUBING, SUCTION, 1/4" X 12', STRAIGHT: Brand: MEDLINE

## (undated) DEVICE — BLADE 1884012 RAD12 5PK CURVED 4MM: Brand: RAD®

## (undated) DEVICE — SYR LUERLOK 20CC BX/50

## (undated) DEVICE — SUT GUT PLN 4/0 SC1 18IN 1824H

## (undated) DEVICE — CONTAINER,SPECIMEN,OR STERILE,4OZ: Brand: MEDLINE

## (undated) DEVICE — SINU FOAM: Brand: SINU-FOAM

## (undated) DEVICE — SPNG GZ 2S 2X2 8PLY STRL PK/2

## (undated) DEVICE — SPONGE,NEURO,0.5"X3",XR,STRL,LF,10/PK: Brand: MEDLINE

## (undated) DEVICE — ADHS LIQ MASTISOL 2/3ML

## (undated) DEVICE — SYR LL TP 10ML STRL

## (undated) DEVICE — PACKING 440406 10PK POPE EPISTAXIS: Brand: MEROCEL®

## (undated) DEVICE — GLV SURG BIOGEL M LTX PF 7 1/2

## (undated) DEVICE — PK TURNOVER RM ADV

## (undated) DEVICE — SUT GUT PLN FAST ABS 5/0 PC1 18IN 1915G

## (undated) DEVICE — TUBING 1895522 5PK STRAIGHTSHOT TO XPS: Brand: STRAIGHTSHOT®

## (undated) DEVICE — SUCTION COAGULATOR, 1 PER POUCH: Brand: A&E MEDICAL / DISPOSABLE SUCTION COAGULATOR

## (undated) DEVICE — ST TB EXT STANDARDBORE 30IN

## (undated) DEVICE — PREP SOL POVIDONE/IODINE BT 4OZ

## (undated) DEVICE — WIPE MEROCEL 3.625X3IN

## (undated) DEVICE — NDL HYPO PRECISIONGLIDE REG 25G 1 1/2

## (undated) DEVICE — COLLECTION SOCK, GENERAL: Brand: DEROYAL

## (undated) DEVICE — KT ANTI FOG W/FLD AND SPNG

## (undated) DEVICE — NDL HYPO PRECISIONGLIDE/REG 18G 11/2 PNK

## (undated) DEVICE — PK ENT HD AND NK 30

## (undated) DEVICE — CODMAN® SURGICAL STRIPS 1" X 6" (25MM X 152MM): Brand: CODMAN®